# Patient Record
Sex: FEMALE | Employment: OTHER | URBAN - METROPOLITAN AREA
[De-identification: names, ages, dates, MRNs, and addresses within clinical notes are randomized per-mention and may not be internally consistent; named-entity substitution may affect disease eponyms.]

---

## 2017-01-10 ENCOUNTER — TELEPHONE (OUTPATIENT)
Dept: FAMILY MEDICINE CLINIC | Facility: CLINIC | Age: 52
End: 2017-01-10

## 2017-01-10 NOTE — TELEPHONE ENCOUNTER
Pt state she has head congestion, clear nasal discharge, fever and body aches last night. Advised increase fluids, rest, hot showers, decongestant. Advised to call if feeling worse in the next couple days. Pt verbalized understanding.

## 2017-01-25 ENCOUNTER — TELEPHONE (OUTPATIENT)
Dept: FAMILY MEDICINE CLINIC | Facility: CLINIC | Age: 52
End: 2017-01-25

## 2017-01-25 NOTE — TELEPHONE ENCOUNTER
pt takes macrobid to prevent uti's - she has run out and requesting new script - pt does not currently have UTI    . ... Please see attached message. Patient states she is in need of Macrobid. No UTI symptoms at this time.   Reports she frequen

## 2017-01-26 RX ORDER — NITROFURANTOIN 25; 75 MG/1; MG/1
100 CAPSULE ORAL 2 TIMES DAILY
Qty: 40 CAPSULE | Refills: 3 | Status: SHIPPED | OUTPATIENT
Start: 2017-01-26 | End: 2018-05-09 | Stop reason: CLARIF

## 2017-01-26 RX ORDER — NITROFURANTOIN 25; 75 MG/1; MG/1
100 CAPSULE ORAL 2 TIMES DAILY
Qty: 40 CAPSULE | Refills: 3 | Status: CANCELLED | OUTPATIENT
Start: 2017-01-26

## 2017-01-26 NOTE — PROGRESS NOTES
Steve 1827   Neurology; follow up CLINIC VISIT  2017    Kristin Rose Patient Status:  No patient class for patient encounter    1965 MRN [id number]   Location ED [facility] PCP DO JOSSUE Pan Disorder in her mother; rheumatoid arthritis in her father. SOCIAL HISTORY:   reports that she has never smoked. She has never used smokeless tobacco. She reports that she drinks alcohol. She reports that she does not use illicit drugs.     ALLERGIES: Patient is a 46year old female in no acute distress. appearance: Normal developed and well nourished , in no acute stress;   HEENT:  Normal conjunctiva, no abnormal secretion,   Neck supple,  No carotid bruit,  thyroid normal  Lungs are clear to ausculta Tremor of right hand    Relevant Orders    MRI BRAIN (W+WO) (CPT=70553)    MRI SPINE CERVICAL (W+WO) (CPT=72396)    Chronic motor tic disorder - Primary    Anxiety        EMG of arms, no signs of neuropathy or cervical radiculopathy,   Labs normal,

## 2017-01-26 NOTE — TELEPHONE ENCOUNTER
pt takes macrobid to prevent uti's - she has run out and requesting new script - pt does not currently have UTI   . ... Please see attached message. Patient states she is in need of Macrobid. No UTI symptoms at this time.  Reports she frequently h

## 2017-01-26 NOTE — PATIENT INSTRUCTIONS
Refill policies:    • Allow 2 business days for refills; controlled substances may take longer.   • Contact your pharmacy at least 5 days prior to running out of medication and have them send an electronic request or submit request through the “request re your physician has recommended that you have a procedure or additional testing performed. [facility] will contact your insurance carrier to obtain pre-certification or prior authorization.     Unfortunately, JAYSHREE has seen an increas

## 2017-01-26 NOTE — TELEPHONE ENCOUNTER
Pt called to check on the status of her request from yesterday, pt is going to be leaving out of town and needs this script. Pt asked to please expedite the script and ask elijah Hernandez to please approve it asap. Please call pt and advise.

## 2017-01-27 ENCOUNTER — TELEPHONE (OUTPATIENT)
Dept: NEUROLOGY | Facility: CLINIC | Age: 52
End: 2017-01-27

## 2017-01-27 NOTE — TELEPHONE ENCOUNTER
Spoke to RIZWANA at [facility], MRI Brain/Cervical [id number] no [place] is needed.  call [facility]#[phone] call time 33:46    Called pt and left a detailed message

## 2017-01-30 NOTE — TELEPHONE ENCOUNTER
MRI brain is the most sensitive test to rule out MS, in her case chance is very small, do not push her to do it,  if cost is the major concern, she probably should hold it, chose to seek another opinion to [facility] movement disorder clinic, call [facility] , find one

## 2017-01-30 NOTE — TELEPHONE ENCOUNTER
Spoke with patient on conveyed that Dr. Gavin Dejesus ordered the MRIs to r/o MS, and that the MRIs are typically the only type of imaging tests that can do this.   She verbalized understanding but still states that she is going to be unable to pay for these tests,

## 2017-01-31 NOTE — TELEPHONE ENCOUNTER
Spoke with patient and relayed below response per Dr Yaz Mejía. Patient states she would like to think about it for a while and will call office with update . She notes she does not want a second opinion at this time.

## 2017-01-31 NOTE — TELEPHONE ENCOUNTER
LMTCB to relay below. Movement disorder MD at [facility], Dr Jessica Gasca [phone].

## 2017-02-03 NOTE — TELEPHONE ENCOUNTER
Patient called back. Given contact nos for [facility] movement disorder MD below. Patient request medical records to be sent to [facility]. Informed patient she will need to sign a release form to fax records. Patient would like NANCY form faxed to fax # [phone].

## 2017-02-06 ENCOUNTER — TELEPHONE (OUTPATIENT)
Dept: NEUROLOGY | Facility: CLINIC | Age: 52
End: 2017-02-06

## 2017-02-06 NOTE — TELEPHONE ENCOUNTER
Original MRI orders were written with requirement of IV sedation. Pt wanted oral sedation instead (Rx for ativan printed) but MRI ordered were not changed. Central Scheduling unable to schedule tests without correct ordered.   Original orders were cancele

## 2017-02-07 NOTE — TELEPHONE ENCOUNTER
Pt calling because she is once again having concerns about the ordered MRIs. When scheduling, she was informed that each test (MRI brain and MRI c-spine) was ordered with and without contrast, thus the tests would take a total of about 3 hours.   Given her

## 2017-02-07 NOTE — TELEPHONE ENCOUNTER
Left detailed message for patient informing her that Dr. Myra Alvarado prefers she have both tests done both with and without contrast, and is will order tests with IV sedation.   Requested c/b from patient if she desires the MRI orders to be re-ordered with IV sed

## 2017-02-08 NOTE — TELEPHONE ENCOUNTER
Pt returned call, stating that she intends to complete the MRIs with IV sedation. Will re-order tests with IV sedation. Instructed patient to wait an hour to c/b central scheduling.

## 2017-02-13 ENCOUNTER — TELEPHONE (OUTPATIENT)
Dept: FAMILY MEDICINE CLINIC | Facility: CLINIC | Age: 52
End: 2017-02-13

## 2017-02-13 VITALS — BODY MASS INDEX: 17.43 KG/M2 | HEIGHT: 68 IN | WEIGHT: 115 LBS

## 2017-02-13 RX ORDER — MULTIVIT WITH MINERALS/LUTEIN
2000 TABLET ORAL DAILY
COMMUNITY
End: 2017-04-20 | Stop reason: ALTCHOICE

## 2017-02-13 RX ORDER — SODIUM CHLORIDE, SODIUM LACTATE, POTASSIUM CHLORIDE, CALCIUM CHLORIDE 600; 310; 30; 20 MG/100ML; MG/100ML; MG/100ML; MG/100ML
INJECTION, SOLUTION INTRAVENOUS CONTINUOUS
Status: CANCELLED | OUTPATIENT
Start: 2017-02-13

## 2017-02-13 NOTE — TELEPHONE ENCOUNTER
Dr. Eli Galvan has ordered the MRI. Attempted to call Dr. Michel Re office. Calls are being answered by answering service, no message left. ,faxed paper to Dr. Pranav Alvarado. Holding in [address]. Does Dr. Dave Toribio want to address this?  Does pt need MR

## 2017-02-14 ENCOUNTER — TELEPHONE (OUTPATIENT)
Dept: NEUROLOGY | Facility: CLINIC | Age: 52
End: 2017-02-14

## 2017-02-14 DIAGNOSIS — A49.9 ESBL (EXTENDED SPECTRUM BETA-LACTAMASE) PRODUCING BACTERIA INFECTION: Primary | ICD-10-CM

## 2017-02-14 DIAGNOSIS — Z16.12 ESBL (EXTENDED SPECTRUM BETA-LACTAMASE) PRODUCING BACTERIA INFECTION: Primary | ICD-10-CM

## 2017-02-14 NOTE — TELEPHONE ENCOUNTER
Spoke to nurse at Dr. Sami Milton office- they will put order in for MRSA cultures. Orders refaxed to them. Sent to scan.

## 2017-02-14 NOTE — TELEPHONE ENCOUNTER
Spoke with Rl Boyd from Dr. Angela Carias office, who states that patient has history of ESBL in her urine, and therefore due to pre-admission testing hospital guidelines must have 2 negative cultures at least 24 hours apart from site of original infection.   She

## 2017-02-15 NOTE — IMAGING NOTE
spoke w/pt. She was concerned about NPO status p/t MRI w/sedation next week 2/22. Reviewed protocol and reason why. Current H&P on chart. Pt had no other questions.

## 2017-02-15 NOTE — PROGRESS NOTES
A busy and understandably worried woman has a history of recurrent uncomplicated UTIs. While growing up as a little girl she had numerous issues with her urethra requiring stent placements and various dilatations.   She is still having recurrent symptoms e

## 2017-02-22 ENCOUNTER — HOSPITAL ENCOUNTER (OUTPATIENT)
Dept: MRI IMAGING | Facility: HOSPITAL | Age: 52
Discharge: HOME | End: 2017-02-22
Attending: Other
Payer: COMMERCIAL

## 2017-02-22 ENCOUNTER — APPOINTMENT (OUTPATIENT)
Dept: LAB | Facility: HOSPITAL | Age: 52
End: 2017-02-22
Attending: Other
Payer: COMMERCIAL

## 2017-02-22 ENCOUNTER — ANESTHESIA (OUTPATIENT)
Dept: PERIOP | Facility: HOSPITAL | Age: 52
End: 2017-02-22
Payer: COMMERCIAL

## 2017-02-22 ENCOUNTER — APPOINTMENT (OUTPATIENT)
Dept: MRI IMAGING | Facility: HOSPITAL | Age: 52
End: 2017-02-22
Attending: Other
Payer: COMMERCIAL

## 2017-02-22 ENCOUNTER — ANESTHESIA EVENT (OUTPATIENT)
Dept: PERIOP | Facility: HOSPITAL | Age: 52
End: 2017-02-22
Payer: COMMERCIAL

## 2017-02-22 VITALS
SYSTOLIC BLOOD PRESSURE: 121 MMHG | TEMPERATURE: 97 F | DIASTOLIC BLOOD PRESSURE: 80 MMHG | RESPIRATION RATE: 14 BRPM | HEART RATE: 58 BPM | OXYGEN SATURATION: 100 %

## 2017-02-22 VITALS
OXYGEN SATURATION: 100 % | DIASTOLIC BLOOD PRESSURE: 78 MMHG | RESPIRATION RATE: 16 BRPM | HEART RATE: 63 BPM | TEMPERATURE: 98 F | SYSTOLIC BLOOD PRESSURE: 127 MMHG

## 2017-02-22 DIAGNOSIS — R25.1 TREMOR OF RIGHT HAND: ICD-10-CM

## 2017-02-22 PROCEDURE — 70553 MRI BRAIN STEM W/O & W/DYE: CPT

## 2017-02-22 PROCEDURE — A9575 INJ GADOTERATE MEGLUMI 0.1ML: HCPCS | Performed by: OTHER

## 2017-02-22 PROCEDURE — 72156 MRI NECK SPINE W/O & W/DYE: CPT

## 2017-02-22 RX ORDER — NALOXONE HYDROCHLORIDE 0.4 MG/ML
80 INJECTION, SOLUTION INTRAMUSCULAR; INTRAVENOUS; SUBCUTANEOUS AS NEEDED
Status: ACTIVE | OUTPATIENT
Start: 2017-02-22 | End: 2017-02-22

## 2017-02-22 RX ORDER — ACETAMINOPHEN 500 MG
1000 TABLET ORAL ONCE AS NEEDED
Status: ACTIVE | OUTPATIENT
Start: 2017-02-22 | End: 2017-02-22

## 2017-02-22 RX ORDER — ONDANSETRON 2 MG/ML
4 INJECTION INTRAMUSCULAR; INTRAVENOUS AS NEEDED
Status: ACTIVE | OUTPATIENT
Start: 2017-02-22 | End: 2017-02-22

## 2017-02-22 RX ORDER — HYDROMORPHONE HYDROCHLORIDE 1 MG/ML
0.4 INJECTION, SOLUTION INTRAMUSCULAR; INTRAVENOUS; SUBCUTANEOUS EVERY 5 MIN PRN
Status: ACTIVE | OUTPATIENT
Start: 2017-02-22 | End: 2017-02-22

## 2017-02-22 RX ORDER — SODIUM CHLORIDE, SODIUM LACTATE, POTASSIUM CHLORIDE, CALCIUM CHLORIDE 600; 310; 30; 20 MG/100ML; MG/100ML; MG/100ML; MG/100ML
INJECTION, SOLUTION INTRAVENOUS CONTINUOUS
Status: DISCONTINUED | OUTPATIENT
Start: 2017-02-22 | End: 2017-02-26

## 2017-02-22 NOTE — IMAGING NOTE
Pt here accompanied by  Isaac Blanton for double study in MRI with anesthesia care. Pt has been NPO since 2200 last night, signed waiver for pregnancy, took med this morning with sips of water. IV placed after second stick.  Pt very nervous about MRI and sed

## 2017-02-22 NOTE — ANESTHESIA POSTPROCEDURE EVALUATION
[facility] Patient Status:  No patient class for patient encounter   Age/Gender 46year old female MRN [id number]   Location [facility] Attending No att. providers found   Hosp Day #  PCP Jacoby Guo

## 2017-02-22 NOTE — ANESTHESIA PREPROCEDURE EVALUATION
PRE-OP EVALUATION    Patient Name: Malini Castro    Pre-op Diagnosis: * No surgery found *    * No surgery found *    * Surgery not found *    Pre-op vitals reviewed.   Pulse: 72  Resp: 12  BP: 128/82 mmHg  SpO2: 100 %  There is no weight on file to calcula discussed with: patient and spouse                Present on Admission:   **None**    Discussed general anesthesia with endotracheal tube/supraglottic airway with patient. Discussed risk of oral/dental trauma, nausea, sore throat rare allergic reactions.

## 2017-02-23 ENCOUNTER — OFFICE VISIT (OUTPATIENT)
Dept: FAMILY MEDICINE CLINIC | Facility: CLINIC | Age: 52
End: 2017-02-23

## 2017-02-23 VITALS
OXYGEN SATURATION: 98 % | BODY MASS INDEX: 18.04 KG/M2 | RESPIRATION RATE: 18 BRPM | HEIGHT: 68 IN | TEMPERATURE: 98 F | HEART RATE: 78 BPM | SYSTOLIC BLOOD PRESSURE: 112 MMHG | DIASTOLIC BLOOD PRESSURE: 80 MMHG | WEIGHT: 119 LBS

## 2017-02-23 DIAGNOSIS — R35.0 FREQUENCY OF URINATION: Primary | ICD-10-CM

## 2017-02-23 DIAGNOSIS — N30.00 ACUTE CYSTITIS WITHOUT HEMATURIA: ICD-10-CM

## 2017-02-23 LAB
APPEARANCE: CLEAR
MULTISTIX LOT#: ABNORMAL NUMERIC
PH, URINE: 6 (ref 4.5–8)
SPECIFIC GRAVITY: 1.01 (ref 1–1.03)
URINE-COLOR: YELLOW
UROBILINOGEN,SEMI-QN: 0.2 MG/DL (ref 0–1.9)

## 2017-02-23 PROCEDURE — 87186 SC STD MICRODIL/AGAR DIL: CPT | Performed by: NURSE PRACTITIONER

## 2017-02-23 PROCEDURE — 87086 URINE CULTURE/COLONY COUNT: CPT | Performed by: NURSE PRACTITIONER

## 2017-02-23 PROCEDURE — 87088 URINE BACTERIA CULTURE: CPT | Performed by: NURSE PRACTITIONER

## 2017-02-23 PROCEDURE — 99213 OFFICE O/P EST LOW 20 MIN: CPT | Performed by: NURSE PRACTITIONER

## 2017-02-23 PROCEDURE — 81003 URINALYSIS AUTO W/O SCOPE: CPT | Performed by: NURSE PRACTITIONER

## 2017-02-23 RX ORDER — AMOXICILLIN 500 MG/1
CAPSULE ORAL
Refills: 5 | COMMUNITY
Start: 2017-02-20 | End: 2017-09-13

## 2017-02-24 NOTE — PROGRESS NOTES
CHIEF COMPLAINT:   Patient presents with:  UTI: which started three weeks ago she felt frequency, and discomfort. Was treating herself with macrobid and symptoms started back up again.  She also took AZO      HPI:   Jurgen Mckay is a 46year old female who Nitrofurantoin Monohyd Macro (MACROBID) 100 MG Oral Cap Take 1 capsule (100 mg total) by mouth 2 (two) times daily.  Used after sex Disp: 40 capsule Rfl: 3      Past Medical History   Diagnosis Date   • Acute cystitis    • Acute sinusitis, unspecified    • Result Value Ref Range   GLUCOSE (URINE DIPSTICK) neg Negative mg/dL   BILIRUBIN neg Negative   KETONES (URINE DIPSTICK) neg Negative mg/dL   SPECIFIC GRAVITY 1.010 1.005 - 1.030   OCCULT BLOOD mod Negative   PH, URINE 6.0 4.5 - 8.0   PROTEIN (URINE DIPSTI Bladder Infection, Female (Adult)    Urine is normally doesn't have any bacteria in it. But bacteria can get into the urinary tract from the skin around the rectum. Or they can travel in the blood from elsewhere in the body.  Once they are in your urinary t · Bowel incontinence  · Pregnancy  · Procedures such as having a catheter inserted  · Older age  · Not emptying your bladder. This can allow bacteria a chance to grow in your urine.   · Dehydration  · Constipation  · Sex  · Use of a diaphragm for birth cont · Avoid caffeine, alcohol, and spicy foods. These can irritate your bladder. · Urinate right after intercourse to flush out your bladder. · If you use birth control pills and have frequent bladder infections, discuss it with your doctor.   Follow-up care

## 2017-02-27 ENCOUNTER — TELEPHONE (OUTPATIENT)
Dept: FAMILY MEDICINE CLINIC | Facility: CLINIC | Age: 52
End: 2017-02-27

## 2017-02-27 NOTE — TELEPHONE ENCOUNTER
Advised pt to monitor her lip, if develops any redness warmth, swelling (signs of infection) to call the office. Pt verbalized understanding.
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
## 2017-02-27 NOTE — TELEPHONE ENCOUNTER
Patient calling back for lab results from yesterday's  that was left. Informed patient of urine culture results and that Macrobid that she is currently taking should cover the infection. Patient states she is feeling better, but symptoms are not gone.

## 2017-03-13 PROBLEM — M47.812 DJD (DEGENERATIVE JOINT DISEASE) OF CERVICAL SPINE: Status: ACTIVE | Noted: 2017-03-13

## 2017-03-13 NOTE — PROGRESS NOTES
Steve 1827   Neurology; follow up CLINIC VISIT  2017    Carmen Payne Patient Status:  No patient class for patient encounter    1965 MRN [id number]   Location [address] [place] [address],      REASON FAMILY HISTORY:  family history includes Hypertension in her father; Thyroid Disorder in her mother; rheumatoid arthritis in her father. SOCIAL HISTORY:   reports that she has been smoking Cigarettes.   She has never used smokeless tobacco. She rep denies food or seasonal allergies      PHYSICAL EXAMINATION:  VITAL SIGNS: /76 mmHg  Pulse 82  Resp 18  Wt 119 lb   Body mass index is 18.1 kg/(m^2). General:  Patient is a 46year old female in no acute distress.   appearance: Normal developed and w Tandem walk is normal      Problem List Items Addressed This Visit     Shakes    Neck pain    Relevant Orders    OP REFERRAL TO [facility]    Neck muscle spasm    Chronic motor tic disorder - Primary    Relevant Orders    NEURO - EXTER agreed this time,         Return if symptoms worsen or fail to improve. We discussed in depth regarding diagnosis, prognosis, treatment. The patient was  given ample opportunity to ask questions. All questions and concerns were addressed.      Renee Saul

## 2017-03-13 NOTE — PATIENT INSTRUCTIONS
Refill policies:    • Allow 2 business days for refills; controlled substances may take longer.   • Contact your pharmacy at least 5 days prior to running out of medication and have them send an electronic request or submit request through the “request re your physician has recommended that you have a procedure or additional testing performed. [facility] will contact your insurance carrier to obtain pre-certification or prior authorization.     Unfortunately, JAYSHREE has seen an increas

## 2017-03-29 ENCOUNTER — TELEPHONE (OUTPATIENT)
Dept: NEUROLOGY | Facility: CLINIC | Age: 52
End: 2017-03-29

## 2017-04-12 ENCOUNTER — TELEPHONE (OUTPATIENT)
Dept: FAMILY MEDICINE CLINIC | Facility: CLINIC | Age: 52
End: 2017-04-12

## 2017-04-12 ENCOUNTER — TELEPHONE (OUTPATIENT)
Dept: SURGERY | Facility: CLINIC | Age: 52
End: 2017-04-12

## 2017-04-12 NOTE — TELEPHONE ENCOUNTER
Patient stated that [address] [employer] sent her to a neurologist which she has been working with . The neurologist feels that she should see a movement disorder specialist.  She has been working with Gabe for a month because the  Is not in network.   Neurologist refuse

## 2017-04-12 NOTE — TELEPHONE ENCOUNTER
Not seeing a denied referral in system. Left message on patient identified voicemail (ok with HIPPA consent) asking patient what she is asking a [id number] review for.

## 2017-04-12 NOTE — TELEPHONE ENCOUNTER
Request is for a movement disorder specialist: Dr Lor Lopez, Dr Carlotta Koenig, or Dr Cary Apley.  States none of the specialists covered under her insurance are actual movement disorder specialists so she needs a [id number] to see above provider

## 2017-04-13 NOTE — TELEPHONE ENCOUNTER
Patient stated that [facility] sent her to a neurologist which she has been working with .  The neurologist feels that she should see a movement disorder specialist.  She has been working with Som Nuñez for a month because the  Is not in [employer] refuse

## 2017-04-18 ENCOUNTER — MED REC SCAN ONLY (OUTPATIENT)
Dept: OBGYN CLINIC | Facility: CLINIC | Age: 52
End: 2017-04-18

## 2017-04-20 ENCOUNTER — OFFICE VISIT (OUTPATIENT)
Dept: FAMILY MEDICINE CLINIC | Facility: CLINIC | Age: 52
End: 2017-04-20

## 2017-04-20 VITALS
HEART RATE: 64 BPM | SYSTOLIC BLOOD PRESSURE: 114 MMHG | HEIGHT: 68 IN | OXYGEN SATURATION: 98 % | DIASTOLIC BLOOD PRESSURE: 82 MMHG | BODY MASS INDEX: 18.04 KG/M2 | RESPIRATION RATE: 18 BRPM | WEIGHT: 119 LBS

## 2017-04-20 DIAGNOSIS — G24.9 DYSTONIA: Primary | ICD-10-CM

## 2017-04-20 PROCEDURE — 99213 OFFICE O/P EST LOW 20 MIN: CPT | Performed by: FAMILY MEDICINE

## 2017-04-20 NOTE — PROGRESS NOTES
He has had an extensive workup for what seems to be dystonia. She has seen Dr. Martel Search the female neurologist here in town who feels that she probably suffers from dystonia.   She also saw another neurologist nearby and everyone agrees that she is best se

## 2017-04-21 ENCOUNTER — TELEPHONE (OUTPATIENT)
Dept: FAMILY MEDICINE CLINIC | Facility: CLINIC | Age: 52
End: 2017-04-21

## 2017-04-21 DIAGNOSIS — G24.9 DYSTONIA: Primary | ICD-10-CM

## 2017-04-21 NOTE — TELEPHONE ENCOUNTER
Patient was seen in office yesterday by Dr Pati Mejía, she needs to be referral to [address] [place] [address],  Dr Oralia Navarro out of [facility].        Internal  Diagnosis:  G24.9  6 visits

## 2017-04-24 ENCOUNTER — OFFICE VISIT (OUTPATIENT)
Dept: OBGYN CLINIC | Facility: CLINIC | Age: 52
End: 2017-04-24

## 2017-04-24 VITALS
WEIGHT: 118 LBS | DIASTOLIC BLOOD PRESSURE: 68 MMHG | HEIGHT: 68 IN | BODY MASS INDEX: 17.88 KG/M2 | HEART RATE: 80 BPM | SYSTOLIC BLOOD PRESSURE: 126 MMHG

## 2017-04-24 DIAGNOSIS — Z01.419 WELL WOMAN EXAM WITH ROUTINE GYNECOLOGICAL EXAM: Primary | ICD-10-CM

## 2017-04-24 DIAGNOSIS — Z12.31 VISIT FOR SCREENING MAMMOGRAM: ICD-10-CM

## 2017-04-24 PROCEDURE — 99396 PREV VISIT EST AGE 40-64: CPT | Performed by: OBSTETRICS & GYNECOLOGY

## 2017-04-24 NOTE — PROGRESS NOTES
Fabrizio Del Valle is a 46year old female  No LMP recorded. Patient is not currently having periods (Reason: Menopause). Patient presents with:  Wellness Visit: annual exam  .  She has no complaints.   Denies postmenopausal bleeding, urinary or sexual is equivalent per week         Drug Use: No    Sexual Activity: Not on file   Not on file  Other Topics Concern    Caffeine Concern No    Exercise Yes    Comment: 3 x week     Social History Narrative       FAMILY HISTORY:  Family History   Problem Relation A situation.  Appropriate mood and affect    Pelvic Exam:  External Genitalia: normal appearance, hair distribution, and no lesions  Urethral Meatus:  normal in size, location, without lesions and prolapse  Bladder:  No fullness, masses or tenderness  Vagina:

## 2017-04-27 ENCOUNTER — TELEPHONE (OUTPATIENT)
Dept: FAMILY MEDICINE CLINIC | Facility: CLINIC | Age: 52
End: 2017-04-27

## 2017-06-08 ENCOUNTER — MED REC SCAN ONLY (OUTPATIENT)
Dept: OBGYN CLINIC | Facility: CLINIC | Age: 52
End: 2017-06-08

## 2017-07-05 PROBLEM — G24.3 CERVICAL DYSTONIA: Status: ACTIVE | Noted: 2017-07-05

## 2017-07-05 PROBLEM — J32.9 OTHER SINUSITIS: Status: ACTIVE | Noted: 2017-07-05

## 2017-10-30 ENCOUNTER — OFFICE VISIT (OUTPATIENT)
Dept: FAMILY MEDICINE CLINIC | Facility: CLINIC | Age: 52
End: 2017-10-30

## 2017-10-30 VITALS
WEIGHT: 118 LBS | BODY MASS INDEX: 17.88 KG/M2 | SYSTOLIC BLOOD PRESSURE: 124 MMHG | TEMPERATURE: 98 F | HEART RATE: 80 BPM | DIASTOLIC BLOOD PRESSURE: 84 MMHG | OXYGEN SATURATION: 97 % | RESPIRATION RATE: 16 BRPM | HEIGHT: 68 IN

## 2017-10-30 DIAGNOSIS — J02.9 SORE THROAT: ICD-10-CM

## 2017-10-30 DIAGNOSIS — J06.9 UPPER RESPIRATORY TRACT INFECTION, UNSPECIFIED TYPE: Primary | ICD-10-CM

## 2017-10-30 PROCEDURE — 99213 OFFICE O/P EST LOW 20 MIN: CPT | Performed by: PHYSICIAN ASSISTANT

## 2017-10-30 RX ORDER — AZITHROMYCIN 250 MG/1
TABLET, FILM COATED ORAL
Qty: 6 TABLET | Refills: 0 | Status: SHIPPED | OUTPATIENT
Start: 2017-10-30 | End: 2018-01-20

## 2017-10-30 NOTE — PROGRESS NOTES
HPI:   Sae Jiménez is a 46year old female who presents for cold symptoms for  5  days.  Patient reports sore throat, congestion, yellow colored nasal discharge, post nasal drip, has lost her voice, felt feverish but did not check her temperature, denies Years: 0.00         Types: Cigarettes  Smokeless tobacco: Never Used                      Comment: smoked socially 3-4 cigarettes per week  Alcohol use: Yes           4.2 oz/week     Standard drinks or equivalent: 7 per week          REVIEW OF SYSTEMS:   G care.

## 2018-01-20 ENCOUNTER — OFFICE VISIT (OUTPATIENT)
Dept: FAMILY MEDICINE CLINIC | Facility: CLINIC | Age: 53
End: 2018-01-20

## 2018-01-20 VITALS
SYSTOLIC BLOOD PRESSURE: 116 MMHG | DIASTOLIC BLOOD PRESSURE: 76 MMHG | HEART RATE: 99 BPM | OXYGEN SATURATION: 98 % | TEMPERATURE: 100 F | BODY MASS INDEX: 17.88 KG/M2 | HEIGHT: 68 IN | RESPIRATION RATE: 18 BRPM | WEIGHT: 118 LBS

## 2018-01-20 DIAGNOSIS — M22.41 CHONDROMALACIA OF PATELLA, RIGHT: ICD-10-CM

## 2018-01-20 DIAGNOSIS — J11.1 INFLUENZA: Primary | ICD-10-CM

## 2018-01-20 PROCEDURE — 99213 OFFICE O/P EST LOW 20 MIN: CPT | Performed by: FAMILY MEDICINE

## 2018-01-20 RX ORDER — ONDANSETRON 4 MG/1
4 TABLET, ORALLY DISINTEGRATING ORAL EVERY 8 HOURS PRN
Qty: 15 TABLET | Refills: 0 | Status: SHIPPED | OUTPATIENT
Start: 2018-01-20 | End: 2018-05-09

## 2018-01-20 RX ORDER — OSELTAMIVIR PHOSPHATE 75 MG/1
75 CAPSULE ORAL 2 TIMES DAILY
Qty: 10 CAPSULE | Refills: 0 | Status: SHIPPED | OUTPATIENT
Start: 2018-01-20 | End: 2018-01-25

## 2018-01-20 RX ORDER — AMOXICILLIN 500 MG/1
500 CAPSULE ORAL 2 TIMES DAILY
COMMUNITY
End: 2018-02-21

## 2018-01-20 NOTE — PROGRESS NOTES
Beginning the fourth day of head congestion and dry cough. Has had tactile fevers did not take her temperature. Positive chills. No nausea. No vomiting. Diffuse myalgias.    is been suffering with a cold for about 10 days but became acutely ill % External Cream Apply 1 Application topically 2 (two) times daily. Apply to back twice daily Disp: 45 g Rfl: 3   Nitrofurantoin Monohyd Macro (MACROBID) 100 MG Oral Cap Take 1 capsule (100 mg total) by mouth 2 (two) times daily.  Used after sex Disp: 36 ca

## 2018-01-22 ENCOUNTER — TELEPHONE (OUTPATIENT)
Dept: FAMILY MEDICINE CLINIC | Facility: CLINIC | Age: 53
End: 2018-01-22

## 2018-01-22 NOTE — TELEPHONE ENCOUNTER
PA started on cover my meds await decision. Spoke with pt she states Dr Antonio Askew originally prescribed this  Back in 12/20/17 for chondromalacia of right patella.   Pt states she has tried ASA,advil,aleve,motrin,naprosyn and otc icy hot patches with no relie

## 2018-01-25 NOTE — TELEPHONE ENCOUNTER
Diclofenac 1.5% solution denied per pt's insurance. Case [id number].   In order to approve coverage of this, program requirements must be met:  Must have tried a generic prescription strength oral NSAID in the past 90 days unless documented side effect or cont

## 2018-05-09 ENCOUNTER — OFFICE VISIT (OUTPATIENT)
Dept: OBGYN CLINIC | Facility: CLINIC | Age: 53
End: 2018-05-09

## 2018-05-09 VITALS
DIASTOLIC BLOOD PRESSURE: 52 MMHG | HEIGHT: 68 IN | WEIGHT: 119 LBS | BODY MASS INDEX: 18.04 KG/M2 | HEART RATE: 80 BPM | SYSTOLIC BLOOD PRESSURE: 114 MMHG

## 2018-05-09 DIAGNOSIS — F52.6 DYSPAREUNIA IN FEMALE NOT DUE TO SUBSTANCE OR KNOWN PHYSIOLOGICAL CONDITION: ICD-10-CM

## 2018-05-09 DIAGNOSIS — Z12.39 SCREENING FOR MALIGNANT NEOPLASM OF BREAST: ICD-10-CM

## 2018-05-09 DIAGNOSIS — Z01.419 WELL WOMAN EXAM WITH ROUTINE GYNECOLOGICAL EXAM: Primary | ICD-10-CM

## 2018-05-09 PROCEDURE — 99396 PREV VISIT EST AGE 40-64: CPT | Performed by: OBSTETRICS & GYNECOLOGY

## 2018-05-09 NOTE — PROGRESS NOTES
Tesfaye Britton is a 45year old female  No LMP recorded. Patient is not currently having periods (Reason: Menopause). Patient presents with:  Wellness Visit  . She has no complaints. Denies postmenopausal bleeding, urinary or sexual issues.   Blood Occupational History  None on file     Social History Main Topics   Smoking status: Current Some Day Smoker     Types: Cigarettes    Smokeless tobacco: Never Used    Comment: smoked socially 3-4 cigarettes per week    Alcohol use Yes  4.2 oz/week    7 bruising or bleeding.       PHYSICAL EXAM:   Constitutional: well developed, well nourished  Head/Face: normocephalic  Neck/Thyroid: thyroid symmetric, no thyromegaly, no nodules, no adenopathy  Breast: normal without palpable masses, tenderness, asymmetry,

## 2018-05-17 ENCOUNTER — TELEPHONE (OUTPATIENT)
Dept: FAMILY MEDICINE CLINIC | Facility: CLINIC | Age: 53
End: 2018-05-17

## 2018-05-17 RX ORDER — NITROFURANTOIN 25; 75 MG/1; MG/1
CAPSULE ORAL
Qty: 60 CAPSULE | Refills: 0 | Status: SHIPPED | OUTPATIENT
Start: 2018-05-17 | End: 2018-09-19

## 2018-05-17 NOTE — TELEPHONE ENCOUNTER
Pt called back to check on the status of her refill, pt is leaving out of town tomorrow. Please call and advise.

## 2018-05-17 NOTE — TELEPHONE ENCOUNTER
Going out of town this weekend.   She had refills on her prescription but it  and she didn't realized it for macrobid

## 2018-08-01 NOTE — PROGRESS NOTES
HPI:   Darnell Lamb is a 45year old female with cervical dystonia, recurrent UTI, acne who presents with UTI symptoms. Pt finished 14 day course of macrobid twice daily on Sunday evening. Takes macrobid before/after intercourse.      Symptoms started • Oral aphthae    • Pap smear for cervical cancer screening 01/16,11/13,02/11,10/9,08/08,05/07,02/06,02/05,11/03    negative   • Personal history of urinary (tract) infection    • Pleurisy without mention of effusion or current tuberculosis    • Pleurisy on bactrim. Urology consult-Dr Yara Curtis.   Drink plenty of water, avoid holding bladder, urinate after intercourse. RTC in 1 week if not better or sooner if needed. Pt's questions answered to satisfaction.  She verbalized understanding and is in agreement w

## 2018-08-02 ENCOUNTER — OFFICE VISIT (OUTPATIENT)
Dept: FAMILY MEDICINE CLINIC | Facility: CLINIC | Age: 53
End: 2018-08-02
Payer: COMMERCIAL

## 2018-08-02 VITALS
HEIGHT: 68 IN | BODY MASS INDEX: 17.88 KG/M2 | HEART RATE: 86 BPM | WEIGHT: 118 LBS | SYSTOLIC BLOOD PRESSURE: 126 MMHG | TEMPERATURE: 98 F | DIASTOLIC BLOOD PRESSURE: 82 MMHG | OXYGEN SATURATION: 98 % | RESPIRATION RATE: 16 BRPM

## 2018-08-02 DIAGNOSIS — N39.0 RECURRENT UTI (URINARY TRACT INFECTION): Primary | ICD-10-CM

## 2018-08-02 LAB
BILIRUBIN: NEGATIVE
GLUCOSE (URINE DIPSTICK): NEGATIVE MG/DL
KETONES (URINE DIPSTICK): NEGATIVE MG/DL
LEUKOCYTES: NEGATIVE
MULTISTIX LOT#: NORMAL NUMERIC
NITRITE, URINE: NEGATIVE
PH, URINE: 5.5 (ref 4.5–8)
PROTEIN (URINE DIPSTICK): NEGATIVE MG/DL
SPECIFIC GRAVITY: 1.01 (ref 1–1.03)

## 2018-08-02 PROCEDURE — 87086 URINE CULTURE/COLONY COUNT: CPT | Performed by: PHYSICIAN ASSISTANT

## 2018-08-02 PROCEDURE — 81003 URINALYSIS AUTO W/O SCOPE: CPT | Performed by: PHYSICIAN ASSISTANT

## 2018-08-02 PROCEDURE — 99213 OFFICE O/P EST LOW 20 MIN: CPT | Performed by: PHYSICIAN ASSISTANT

## 2018-08-02 RX ORDER — SULFAMETHOXAZOLE AND TRIMETHOPRIM 800; 160 MG/1; MG/1
1 TABLET ORAL 2 TIMES DAILY
Qty: 14 TABLET | Refills: 0 | Status: SHIPPED | OUTPATIENT
Start: 2018-08-02 | End: 2018-08-08

## 2018-08-08 PROCEDURE — 81015 MICROSCOPIC EXAM OF URINE: CPT | Performed by: UROLOGY

## 2018-08-20 ENCOUNTER — OFFICE VISIT (OUTPATIENT)
Dept: FAMILY MEDICINE CLINIC | Facility: CLINIC | Age: 53
End: 2018-08-20
Payer: COMMERCIAL

## 2018-08-20 VITALS
HEIGHT: 68 IN | OXYGEN SATURATION: 98 % | RESPIRATION RATE: 20 BRPM | WEIGHT: 115 LBS | TEMPERATURE: 99 F | DIASTOLIC BLOOD PRESSURE: 80 MMHG | HEART RATE: 89 BPM | BODY MASS INDEX: 17.43 KG/M2 | SYSTOLIC BLOOD PRESSURE: 118 MMHG

## 2018-08-20 DIAGNOSIS — R30.0 DYSURIA: Primary | ICD-10-CM

## 2018-08-20 LAB
APPEARANCE: CLEAR
BILIRUBIN: NEGATIVE
GLUCOSE (URINE DIPSTICK): NEGATIVE MG/DL
KETONES (URINE DIPSTICK): NEGATIVE MG/DL
MULTISTIX LOT#: ABNORMAL NUMERIC
NITRITE, URINE: NEGATIVE
PH, URINE: 5.5 (ref 4.5–8)
PROTEIN (URINE DIPSTICK): NEGATIVE MG/DL
SPECIFIC GRAVITY: 1 (ref 1–1.03)
UROBILINOGEN,SEMI-QN: 0.2 MG/DL (ref 0–1.9)

## 2018-08-20 PROCEDURE — 81003 URINALYSIS AUTO W/O SCOPE: CPT | Performed by: NURSE PRACTITIONER

## 2018-08-20 PROCEDURE — 87086 URINE CULTURE/COLONY COUNT: CPT | Performed by: NURSE PRACTITIONER

## 2018-08-20 PROCEDURE — 99213 OFFICE O/P EST LOW 20 MIN: CPT | Performed by: NURSE PRACTITIONER

## 2018-08-20 RX ORDER — SULFAMETHOXAZOLE AND TRIMETHOPRIM 800; 160 MG/1; MG/1
1 TABLET ORAL 2 TIMES DAILY
Qty: 14 TABLET | Refills: 0 | Status: SHIPPED | OUTPATIENT
Start: 2018-08-20 | End: 2018-08-27

## 2018-08-20 RX ORDER — NITROFURANTOIN 25; 75 MG/1; MG/1
CAPSULE ORAL
Qty: 14 CAPSULE | Refills: 0 | Status: SHIPPED | OUTPATIENT
Start: 2018-08-20 | End: 2018-08-20 | Stop reason: ALTCHOICE

## 2018-08-20 NOTE — PATIENT INSTRUCTIONS
Dysuria with Uncertain Cause (Adult)    The urethra is the tube that allows urine to pass out of the body. In a woman, the urethra is the opening above the vagina. In men, the urethra is the opening on the tip of the penis.  Dysuria is the feeling of pain · Contact your healthcare provider or go to an urgent care clinic or the public health department to be looked at and treated.   · Don't have sex until both you and your partner(s) have finished all antibiotics and your healthcare provider says you are no l

## 2018-08-20 NOTE — PROGRESS NOTES
CHIEF COMPLAINT:   Patient presents with:  Urinary Frequency: s/s since AM. No OTC meds taken  Painful Urination      HPI:   Geovanna Vazquez is a 45year old female who presents with symptoms of UTI.  Complaining of urinary frequency and dysuria since this mo • Pleurisy without mention of effusion or current tuberculosis    • Pleurisy without mention of effusion or current tuberculosis       Social History:  Smoking status: Current Some Day Smoker                                                    Packs/day: 0. [employer] Expiration Date 5/31/19 Date         ASSESSMENT AND PLAN:   Hannah Parent is a 45year old female presents with UTI symptoms. ASSESSMENT:  Uti symptoms  (primary encounter diagnosis)    PLAN:   - Dipstick with small leuks and trace blood.   - L Soaps, lotions, colognes and feminine hygiene products can cause dysuria. So can birth control jellies, creams, and foams. It will go away 1 to 3 days after using these irritants.   Sexually transmitted diseases (STDs) such as chlamydia or gonorrhea can cau Follow up with your healthcare provider, or as advised. If a culture was taken, you may call as directed for the results. If you have an STD, follow up with your provider or the public health department for a complete STD screening, including HIV testing.

## 2018-09-17 ENCOUNTER — APPOINTMENT (OUTPATIENT)
Dept: GENERAL RADIOLOGY | Age: 53
End: 2018-09-17
Attending: EMERGENCY MEDICINE
Payer: COMMERCIAL

## 2018-09-17 ENCOUNTER — HOSPITAL ENCOUNTER (EMERGENCY)
Age: 53
Discharge: HOME | End: 2018-09-17
Attending: EMERGENCY MEDICINE
Payer: COMMERCIAL

## 2018-09-17 VITALS
TEMPERATURE: 97 F | HEIGHT: 68 IN | BODY MASS INDEX: 17.43 KG/M2 | SYSTOLIC BLOOD PRESSURE: 128 MMHG | HEART RATE: 84 BPM | DIASTOLIC BLOOD PRESSURE: 89 MMHG | RESPIRATION RATE: 18 BRPM | WEIGHT: 115 LBS | OXYGEN SATURATION: 98 %

## 2018-09-17 DIAGNOSIS — B34.9 VIRAL SYNDROME: Primary | ICD-10-CM

## 2018-09-17 DIAGNOSIS — J98.01 BRONCHOSPASM: ICD-10-CM

## 2018-09-17 PROCEDURE — 94640 AIRWAY INHALATION TREATMENT: CPT

## 2018-09-17 PROCEDURE — 71046 X-RAY EXAM CHEST 2 VIEWS: CPT | Performed by: EMERGENCY MEDICINE

## 2018-09-17 PROCEDURE — 94664 DEMO&/EVAL PT USE INHALER: CPT

## 2018-09-17 PROCEDURE — 99284 EMERGENCY DEPT VISIT MOD MDM: CPT

## 2018-09-17 RX ORDER — IPRATROPIUM BROMIDE AND ALBUTEROL SULFATE 2.5; .5 MG/3ML; MG/3ML
3 SOLUTION RESPIRATORY (INHALATION) ONCE
Status: COMPLETED | OUTPATIENT
Start: 2018-09-17 | End: 2018-09-17

## 2018-09-17 RX ORDER — AZITHROMYCIN 250 MG/1
TABLET, FILM COATED ORAL
Qty: 1 PACKAGE | Refills: 0 | Status: SHIPPED | OUTPATIENT
Start: 2018-09-17 | End: 2018-09-22

## 2018-09-17 RX ORDER — ALBUTEROL SULFATE 90 UG/1
2 AEROSOL, METERED RESPIRATORY (INHALATION) EVERY 4 HOURS PRN
Qty: 1 INHALER | Refills: 0 | Status: SHIPPED | OUTPATIENT
Start: 2018-09-17 | End: 2018-10-17

## 2018-09-17 RX ORDER — PREDNISONE 20 MG/1
20 TABLET ORAL 2 TIMES DAILY
Qty: 10 TABLET | Refills: 0 | Status: SHIPPED | OUTPATIENT
Start: 2018-09-17 | End: 2018-09-22

## 2018-09-17 NOTE — ED PROVIDER NOTES
Patient Seen in: [facility] Emergency Department In [place]    History   Patient presents with:  Dyspnea GENE SOB (respiratory)    Stated Complaint: [place]    HPI    Patient complains of cough and congestion symptoms that started on Thursday.   P Never Smoker      Smokeless tobacco: Never Used      Tobacco comment: smoked socially 3-4 cigarettes per week    Alcohol use:  Yes      Alcohol/week: 4.2 oz      Types: 7 Standard drinks or equivalent per week    Drug use: No      Review of Systems    Posit with what sounds like a viral URI. Her symptoms are also suggestive of viral syndrome. The patient has bronchospasm on exam.  She may benefit from bronchodilator and a short course of steroid.   Unless pneumonia identified on x-ray, as noted this is likel

## 2018-09-17 NOTE — ED INITIAL ASSESSMENT (HPI)
Non productive cough for 3-4 days, tonight she had a coughing fit and she was unable to catch her breath and felt like her throat was closing.  No distress noted

## 2018-09-19 ENCOUNTER — TELEPHONE (OUTPATIENT)
Dept: FAMILY MEDICINE CLINIC | Facility: CLINIC | Age: 53
End: 2018-09-19

## 2018-09-19 ENCOUNTER — OFFICE VISIT (OUTPATIENT)
Dept: FAMILY MEDICINE CLINIC | Facility: CLINIC | Age: 53
End: 2018-09-19
Payer: COMMERCIAL

## 2018-09-19 VITALS
RESPIRATION RATE: 20 BRPM | OXYGEN SATURATION: 98 % | WEIGHT: 118 LBS | TEMPERATURE: 99 F | DIASTOLIC BLOOD PRESSURE: 80 MMHG | HEART RATE: 58 BPM | HEIGHT: 68 IN | SYSTOLIC BLOOD PRESSURE: 126 MMHG | BODY MASS INDEX: 17.88 KG/M2

## 2018-09-19 DIAGNOSIS — R09.81 NASAL CONGESTION: ICD-10-CM

## 2018-09-19 DIAGNOSIS — I49.9 IRREGULAR HEART RHYTHM: ICD-10-CM

## 2018-09-19 DIAGNOSIS — R05.9 COUGH: Primary | ICD-10-CM

## 2018-09-19 PROCEDURE — 93000 ELECTROCARDIOGRAM COMPLETE: CPT | Performed by: PHYSICIAN ASSISTANT

## 2018-09-19 PROCEDURE — 99214 OFFICE O/P EST MOD 30 MIN: CPT | Performed by: PHYSICIAN ASSISTANT

## 2018-09-19 NOTE — PROGRESS NOTES
HPI:   Felicity Alatorre is a 45year old female who presents for ER follow up. Pt seen at [facility] ER on 9/17/18. She was diagnosed with a viral URI; she was given a duoneb in the ER.  She had a chest xray which showed mild hyperinflation without mass or infiltr valve disorders(424.0)    • Oral aphthae    • Pap smear for cervical cancer screening 01/16,11/13,02/11,10/9,08/08,05/07,02/06,02/05,11/03    negative   • Personal history of urinary (tract) infection    • Pleurisy without mention of effusion or current tu ELECTROCARDIOGRAM, COMPLETE    Note and results from ER reviewed in detail and discussed. Take antibiotics to completion and continue supportive measures. Follow up if symptoms persist or worsen.   Tiff Schulte was given an opportunity to ask questions and ve

## 2018-09-19 NOTE — TELEPHONE ENCOUNTER
Spoke with patient, advised to start with 1 tablet BID, monitor symptoms closely and then increase if symptoms are not covered, but not to exceed the max dose per medication box directions. Pt expressed understanding and agreement. Task completed.

## 2018-09-25 ENCOUNTER — TELEPHONE (OUTPATIENT)
Dept: FAMILY MEDICINE CLINIC | Facility: CLINIC | Age: 53
End: 2018-09-25

## 2018-09-25 NOTE — TELEPHONE ENCOUNTER
Pt called and she will be in meetings all afternoon, so she will just be in tomorrow at appointment at 10:00am

## 2018-12-31 ENCOUNTER — OFFICE VISIT (OUTPATIENT)
Dept: FAMILY MEDICINE CLINIC | Facility: CLINIC | Age: 53
End: 2018-12-31
Payer: COMMERCIAL

## 2018-12-31 VITALS
OXYGEN SATURATION: 94 % | DIASTOLIC BLOOD PRESSURE: 82 MMHG | HEART RATE: 68 BPM | RESPIRATION RATE: 16 BRPM | BODY MASS INDEX: 18.34 KG/M2 | TEMPERATURE: 98 F | HEIGHT: 68 IN | WEIGHT: 121 LBS | SYSTOLIC BLOOD PRESSURE: 132 MMHG

## 2018-12-31 DIAGNOSIS — M43.6 SPASTIC TORTICOLLIS: Primary | ICD-10-CM

## 2018-12-31 PROCEDURE — 99213 OFFICE O/P EST LOW 20 MIN: CPT | Performed by: FAMILY MEDICINE

## 2018-12-31 NOTE — PROGRESS NOTES
Patient is here today because she has been experiencing a very upsetting condition that is described as torticollis-like or dystonic like.   Is been going on for a large number of months and is now bothersome because it seems to be cauterizing of hoarseness

## 2019-01-23 NOTE — PROGRESS NOTES
HPI:   Kristen Villalpando is a 52year old female who presents with UTI symptoms.      Symptoms started 2 months ago, worse in the last week  Urine seems cloudy  +mild Dysuria  Denies hematuria  Denies Urinary frequency  Denies urinary urgency  Denies fever/chil Use      Smoking status: Never Smoker      Smokeless tobacco: Never Used      Tobacco comment: smoked socially 3-4 cigarettes per week    Alcohol use: Yes      Alcohol/week: 4.2 oz      Types: 7 Standard drinks or equivalent per week    Drug use:  No not better or sooner if needed  Pt's questions answered to satisfaction. She verbalized understanding and is in agreement with plan.

## 2019-01-24 ENCOUNTER — OFFICE VISIT (OUTPATIENT)
Dept: FAMILY MEDICINE CLINIC | Facility: CLINIC | Age: 54
End: 2019-01-24
Payer: COMMERCIAL

## 2019-01-24 VITALS
WEIGHT: 121 LBS | TEMPERATURE: 98 F | OXYGEN SATURATION: 98 % | HEART RATE: 63 BPM | SYSTOLIC BLOOD PRESSURE: 126 MMHG | RESPIRATION RATE: 18 BRPM | DIASTOLIC BLOOD PRESSURE: 80 MMHG | HEIGHT: 68 IN | BODY MASS INDEX: 18.34 KG/M2

## 2019-01-24 DIAGNOSIS — M54.9 MID BACK PAIN ON LEFT SIDE: ICD-10-CM

## 2019-01-24 DIAGNOSIS — R09.89 ABNORMAL LUNG SOUNDS: ICD-10-CM

## 2019-01-24 DIAGNOSIS — N39.0 RECURRENT UTI: Primary | ICD-10-CM

## 2019-01-24 DIAGNOSIS — R31.29 MICROSCOPIC HEMATURIA: ICD-10-CM

## 2019-01-24 LAB
APPEARANCE: CLEAR
BILIRUBIN: NEGATIVE
GLUCOSE (URINE DIPSTICK): NEGATIVE MG/DL
KETONES (URINE DIPSTICK): NEGATIVE MG/DL
LEUKOCYTES: NEGATIVE
MULTISTIX LOT#: NORMAL NUMERIC
NITRITE, URINE: NEGATIVE
PH, URINE: 6 (ref 4.5–8)
PROTEIN (URINE DIPSTICK): NEGATIVE MG/DL
SPECIFIC GRAVITY: 1 (ref 1–1.03)
URINE-COLOR: YELLOW
UROBILINOGEN,SEMI-QN: 0.2 MG/DL (ref 0–1.9)

## 2019-01-24 PROCEDURE — 81003 URINALYSIS AUTO W/O SCOPE: CPT | Performed by: PHYSICIAN ASSISTANT

## 2019-01-24 PROCEDURE — 87086 URINE CULTURE/COLONY COUNT: CPT | Performed by: PHYSICIAN ASSISTANT

## 2019-01-24 PROCEDURE — 99213 OFFICE O/P EST LOW 20 MIN: CPT | Performed by: PHYSICIAN ASSISTANT

## 2019-03-06 ENCOUNTER — TELEPHONE (OUTPATIENT)
Dept: FAMILY MEDICINE CLINIC | Facility: CLINIC | Age: 54
End: 2019-03-06

## 2019-03-06 NOTE — TELEPHONE ENCOUNTER
Approve/ deny Flonase refill? Last OV was with LEO on 12/31/18  Medication has not been dispensed since 2016  Please advise.

## 2019-03-06 NOTE — TELEPHONE ENCOUNTER
Pt experiencing some seasonal allergies and asking for flonase refill - she does not want to take time off work for appt right now. She has been prescribed this in the past from Dr. Mary Toscano.

## 2019-03-08 RX ORDER — FLUTICASONE PROPIONATE 50 MCG
SPRAY, SUSPENSION (ML) NASAL
Qty: 3 BOTTLE | Refills: 1 | Status: SHIPPED | OUTPATIENT
Start: 2019-03-08 | End: 2020-11-28

## 2019-05-28 ENCOUNTER — TELEPHONE (OUTPATIENT)
Dept: OBGYN CLINIC | Facility: CLINIC | Age: 54
End: 2019-05-28

## 2019-05-28 NOTE — TELEPHONE ENCOUNTER
Spoke with patient regarding missed appointment, patient states she will call back to reschedule once she has her work schedule.

## 2019-06-11 ENCOUNTER — OFFICE VISIT (OUTPATIENT)
Dept: OBGYN CLINIC | Facility: CLINIC | Age: 54
End: 2019-06-11
Payer: COMMERCIAL

## 2019-06-11 VITALS
BODY MASS INDEX: 18.19 KG/M2 | SYSTOLIC BLOOD PRESSURE: 116 MMHG | HEART RATE: 87 BPM | DIASTOLIC BLOOD PRESSURE: 66 MMHG | HEIGHT: 68 IN | WEIGHT: 120 LBS

## 2019-06-11 DIAGNOSIS — Z01.419 WELL WOMAN EXAM WITH ROUTINE GYNECOLOGICAL EXAM: ICD-10-CM

## 2019-06-11 DIAGNOSIS — Z12.31 ENCOUNTER FOR SCREENING MAMMOGRAM FOR MALIGNANT NEOPLASM OF BREAST: Primary | ICD-10-CM

## 2019-06-11 DIAGNOSIS — Z12.4 PAPANICOLAOU SMEAR FOR CERVICAL CANCER SCREENING: ICD-10-CM

## 2019-06-11 PROCEDURE — 88175 CYTOPATH C/V AUTO FLUID REDO: CPT | Performed by: OBSTETRICS & GYNECOLOGY

## 2019-06-11 PROCEDURE — 87624 HPV HI-RISK TYP POOLED RSLT: CPT | Performed by: OBSTETRICS & GYNECOLOGY

## 2019-06-11 PROCEDURE — 99396 PREV VISIT EST AGE 40-64: CPT | Performed by: OBSTETRICS & GYNECOLOGY

## 2019-06-11 NOTE — PROGRESS NOTES
Hannah Parent is a 52year old female  No LMP recorded. (Menstrual status: Menopause). Patient presents with:  Wellness Visit  .          OBSTETRICS HISTORY:  OB History    Para Term  AB Living   3 2 2   1 2   SAB TAB Ectopic Multiple L Worry: Not on file        Inability: Not on file      Transportation needs:        Medical: Not on file        Non-medical: Not on file    Tobacco Use      Smoking status: Never Smoker      Smokeless tobacco: Never Used    Substance and Sexual Activity Known Problems Paternal Grandmother    • No Known Problems Paternal Grandfather    • Cancer Maternal Cousin Female 54        bone   • No Known Problems Daughter    • No Known Problems Son    • Skin cancer Maternal Uncle [de-identified]       MEDICATIONS:    Current Out discharge  Cervix:  Normal without tenderness on motionpap  Uterus: normal in size, contour, position, mobility, without tenderness  Adnexa: normal without masses or tenderness  Perineum: normal  Anus: no hemorroids     Assessment & Plan:  Bro Rick was seen t

## 2019-06-13 ENCOUNTER — TELEPHONE (OUTPATIENT)
Dept: OBGYN CLINIC | Facility: CLINIC | Age: 54
End: 2019-06-13

## 2019-06-13 NOTE — TELEPHONE ENCOUNTER
Pt just called to get her test results. She got her email thru Jadyn with results but does not understand them. Please advise and call pt.  Thanks

## 2019-06-17 ENCOUNTER — TELEPHONE (OUTPATIENT)
Dept: OBGYN CLINIC | Facility: CLINIC | Age: 54
End: 2019-06-17

## 2019-06-17 ENCOUNTER — MED REC SCAN ONLY (OUTPATIENT)
Dept: OBGYN CLINIC | Facility: CLINIC | Age: 54
End: 2019-06-17

## 2019-11-18 ENCOUNTER — OFFICE VISIT (OUTPATIENT)
Dept: FAMILY MEDICINE CLINIC | Facility: CLINIC | Age: 54
End: 2019-11-18
Payer: COMMERCIAL

## 2019-11-18 VITALS
RESPIRATION RATE: 16 BRPM | DIASTOLIC BLOOD PRESSURE: 86 MMHG | WEIGHT: 122 LBS | BODY MASS INDEX: 18.49 KG/M2 | HEIGHT: 68 IN | OXYGEN SATURATION: 98 % | TEMPERATURE: 98 F | HEART RATE: 78 BPM | SYSTOLIC BLOOD PRESSURE: 124 MMHG

## 2019-11-18 DIAGNOSIS — R23.8 SCALP IRRITATION: Primary | ICD-10-CM

## 2019-11-18 DIAGNOSIS — R59.0 OCCIPITAL LYMPHADENOPATHY: ICD-10-CM

## 2019-11-18 PROCEDURE — 99213 OFFICE O/P EST LOW 20 MIN: CPT | Performed by: PHYSICIAN ASSISTANT

## 2019-11-18 RX ORDER — KETOCONAZOLE 20 MG/ML
SHAMPOO TOPICAL
Qty: 120 ML | Refills: 0 | Status: SHIPPED | OUTPATIENT
Start: 2019-11-18 | End: 2020-11-28

## 2019-11-18 NOTE — PROGRESS NOTES
HPI:    Patient ID: Kristin Rose is a 52year old female. HPI   Patient presents today with concerns of an itchy rash of her scalp and enlarged occipital lymph nodes.   States [facility] she was at a salon where clients can self style their hair using SWELLING    Comment:Facial swelling   PHYSICAL EXAM:   Physical Exam   Nursing note and vitals reviewed. Constitutional: She appears well-developed and well-nourished. No distress. HENT:   Head: Normocephalic and atraumatic.    Lymphadenopathy:        H

## 2019-11-21 ENCOUNTER — OFFICE VISIT (OUTPATIENT)
Dept: FAMILY MEDICINE CLINIC | Facility: CLINIC | Age: 54
End: 2019-11-21
Payer: COMMERCIAL

## 2019-11-21 VITALS
OXYGEN SATURATION: 98 % | WEIGHT: 122 LBS | HEART RATE: 69 BPM | BODY MASS INDEX: 18.49 KG/M2 | DIASTOLIC BLOOD PRESSURE: 82 MMHG | RESPIRATION RATE: 18 BRPM | SYSTOLIC BLOOD PRESSURE: 120 MMHG | HEIGHT: 68 IN | TEMPERATURE: 98 F

## 2019-11-21 DIAGNOSIS — B85.0 PEDICULOSIS CAPITIS: Primary | ICD-10-CM

## 2019-11-21 PROCEDURE — 99213 OFFICE O/P EST LOW 20 MIN: CPT | Performed by: PHYSICIAN ASSISTANT

## 2019-11-21 NOTE — PROGRESS NOTES
HPI:    Patient ID: Grabiel Wong is a 62year old female. HPI   Patient here for follow-up of scalp irritation. She was last seen in the office 3 days ago.   At that time her exam was not typical for pediculosis infection and was empirically treated wi Normocephalic and atraumatic. Lymphadenopathy:        Head (right side): Occipital adenopathy present. Head (left side): Occipital adenopathy present. Skin: Rash noted. Rash is macular (erythematous macular rash of the scalp, no vesicles.  Small

## 2020-06-16 ENCOUNTER — OFFICE VISIT (OUTPATIENT)
Dept: FAMILY MEDICINE CLINIC | Facility: CLINIC | Age: 55
End: 2020-06-16
Payer: COMMERCIAL

## 2020-06-16 VITALS
HEART RATE: 80 BPM | BODY MASS INDEX: 18.04 KG/M2 | HEIGHT: 68 IN | SYSTOLIC BLOOD PRESSURE: 122 MMHG | WEIGHT: 119 LBS | RESPIRATION RATE: 18 BRPM | DIASTOLIC BLOOD PRESSURE: 76 MMHG | TEMPERATURE: 98 F

## 2020-06-16 DIAGNOSIS — M25.579 ANKLE PAIN, UNSPECIFIED CHRONICITY, UNSPECIFIED LATERALITY: Primary | ICD-10-CM

## 2020-06-16 PROCEDURE — 99213 OFFICE O/P EST LOW 20 MIN: CPT | Performed by: FAMILY MEDICINE

## 2020-06-16 NOTE — PROGRESS NOTES
Since nearly the beginning of May the patient has been developing slight redness and discomfort over the lateral surface of the left lateral malleolus. Since her birth and as a child she had developmental issues with her lower extremities.   She has Laisha

## 2020-06-18 ENCOUNTER — TELEPHONE (OUTPATIENT)
Dept: FAMILY MEDICINE CLINIC | Facility: CLINIC | Age: 55
End: 2020-06-18

## 2020-06-18 NOTE — TELEPHONE ENCOUNTER
Patient was seen on 6/16. Dr Enrriuqe Buckner told her to use Diflucan Gel 1% 4xs daily. She now states that just this morning there is a small open wound on area and is wondering if she should use the gel or not. Please advise.

## 2020-06-22 ENCOUNTER — OFFICE VISIT (OUTPATIENT)
Dept: FAMILY MEDICINE CLINIC | Facility: CLINIC | Age: 55
End: 2020-06-22
Payer: COMMERCIAL

## 2020-06-22 VITALS
WEIGHT: 121.19 LBS | HEIGHT: 68 IN | BODY MASS INDEX: 18.37 KG/M2 | RESPIRATION RATE: 16 BRPM | OXYGEN SATURATION: 99 % | HEART RATE: 75 BPM | SYSTOLIC BLOOD PRESSURE: 122 MMHG | DIASTOLIC BLOOD PRESSURE: 80 MMHG

## 2020-06-22 DIAGNOSIS — S91.052A DOG BITE OF LEFT ANKLE, INITIAL ENCOUNTER: Primary | ICD-10-CM
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
## 2020-06-22 PROCEDURE — 99213 OFFICE O/P EST LOW 20 MIN: CPT | Performed by: FAMILY MEDICINE

## 2020-06-22 NOTE — PROGRESS NOTES
Over the weekend this unfortunate woman had her left foot inadvertently attacked by 2 family dogs. It left a flap measuring 3 x 4 mm on the dorsal lateral aspect of the left foot the tendons were intact and spared.   Active passive range of motion in sensa

## 2020-07-16 ENCOUNTER — OFFICE VISIT (OUTPATIENT)
Dept: PODIATRY CLINIC | Facility: CLINIC | Age: 55
End: 2020-07-16
Payer: COMMERCIAL

## 2020-07-16 DIAGNOSIS — M89.8X7 EXOSTOSIS OF BONE OF ANKLE: Primary | ICD-10-CM

## 2020-07-16 PROCEDURE — 99203 OFFICE O/P NEW LOW 30 MIN: CPT | Performed by: PODIATRIST

## 2020-07-16 PROCEDURE — 73600 X-RAY EXAM OF ANKLE: CPT | Performed by: PODIATRIST

## 2020-07-16 NOTE — PROGRESS NOTES
Radha Smith is a 62year old female.  Patient presents with:  New Patient: left ankle - red and inflammed - denies pain when she walks, but its very tender when she lays on that side - pain scale 2/10        HPI:     Rubin Massey today with left ankle pain there no hyperplasia and/or malignancy noted   • Mitral valve disorders(424.0)    • Oral aphthae    • Pap smear for cervical cancer screening 01/16,11/13,02/11,10/9,08/08,05/07,02/06,02/05,11/03    negative   • Personal history of urinary (tract) infection headaches  MUSCULO: denies arthritis, back pain      EXAM:   There were no vitals taken for this visit. Physical Exam  GENERAL: well developed, well nourished, in no apparent distress  EXTREMITIES:   1. Integument: Normal skin temperature and turgor  2.  V

## 2020-11-28 ENCOUNTER — OFFICE VISIT (OUTPATIENT)
Dept: FAMILY MEDICINE CLINIC | Facility: CLINIC | Age: 55
End: 2020-11-28
Payer: COMMERCIAL

## 2020-11-28 VITALS
HEART RATE: 86 BPM | DIASTOLIC BLOOD PRESSURE: 87 MMHG | WEIGHT: 117 LBS | BODY MASS INDEX: 17.73 KG/M2 | RESPIRATION RATE: 16 BRPM | OXYGEN SATURATION: 99 % | TEMPERATURE: 98 F | SYSTOLIC BLOOD PRESSURE: 147 MMHG | HEIGHT: 68 IN

## 2020-11-28 DIAGNOSIS — Z20.822 EXPOSURE TO COVID-19 VIRUS: Primary | ICD-10-CM

## 2020-11-28 PROCEDURE — 3077F SYST BP >= 140 MM HG: CPT | Performed by: NURSE PRACTITIONER

## 2020-11-28 PROCEDURE — 99072 ADDL SUPL MATRL&STAF TM PHE: CPT | Performed by: NURSE PRACTITIONER

## 2020-11-28 PROCEDURE — 99213 OFFICE O/P EST LOW 20 MIN: CPT | Performed by: NURSE PRACTITIONER

## 2020-11-28 PROCEDURE — 3008F BODY MASS INDEX DOCD: CPT | Performed by: NURSE PRACTITIONER

## 2020-11-28 PROCEDURE — 3079F DIAST BP 80-89 MM HG: CPT | Performed by: NURSE PRACTITIONER

## 2020-11-28 NOTE — PROGRESS NOTES
CHIEF COMPLAINT:   Patient presents with:  Covid: exposure to covid-19.  tested + today. no sx      HPI:   Duane Velasquez is a 60year old female who presents for Covid 19 exposure 6 days ago. Reports no symptoms. Requesting covid testing.   At [place] • Fluticasone Propionate 50 MCG/ACT Nasal Suspension SHAKE WELL AND USE 2 SPRAYS IN EACH NOSTRIL DAILY (Patient not taking: Reported on 11/28/2020 ) 3 Bottle 1      Past Medical History:   Diagnosis Date   • Achilles bursitis or tendinitis    • Acne    • A SKIN: no rashes,no suspicious lesions  HEAD: atraumatic, normocephalic.     EYES: conjunctiva clear  EARS: TM's clear grey, no bulging, no retraction, no fluid, bony landmarks visualized  NOSE: Nostrils patent, no nasal discharge, nasal mucosa pink and non- · Stay home. Call your healthcare provider and tell them you have symptoms of COVID-19. Do this before going to any hospital or clinic. Follow your provider's instructions. You may be advised to isolate yourself at home. This is called self-isolation.  You · Stay home and start self-isolation. Don’t leave your home unless you need to get medical care. Don't go to work, school, or public areas. Don't use public transportation or taxis. · Follow all instructions from your healthcare provider.  Call your health There is currently no vaccine or medicine approved to prevent the virus. The FDA has approved an antiviral medicine called remdesivir for people in the hospital. It is for people 12 years and older who weigh more than about 88 pounds (40 kgs).  Remdesivir i If you've had confirmed COVID-19, your healthcare team may ask you to consider donating your plasma. This is called COVID-19 convalescent plasma donation.  Plasma from people fully recovered from COVID-19 may contain antibodies to help fight COVID-19 in peo Your limits are different if you've had COVID-19 in the last 3 months but are fully recovered without symptoms and you have been exposed to someone with COVID-19. If you are symptom-free, you don't need to stay home away from others or be retested.  The CDC When you return to public settings  When you are well enough to go outside your home, consider the CDC's guidance on cloth face masks:     · The CDC advises all people over age 2 to wear cloth face masks in public settings when around people outside of the © 1093-8977 [employer] [address]. All rights reserved. This information is not intended as a substitute for professional medical care. Always follow your healthcare professional's instructions.             The patient indicates understanding of these i

## 2020-11-28 NOTE — PATIENT INSTRUCTIONS
Coronavirus Disease 2019 (COVID-19): Caring for Yourself or Others   If you or a household member have symptoms of COVID-19, follow these guidelines for preventing spread of the virus, and managing symptoms.    If you think you have COVID-19 symptoms  · S · Tell the healthcare staff about recent travel. This includes local travel on public transport. Staff may need to find other people you have been in contact with. · Follow all instructions the healthcare staff give you.     If you have been diagnosed with There is currently no vaccine or medicine approved to prevent the virus. The FDA has approved an antiviral medicine called remdesivir for people in the hospital. It is for people 12 years and older who weigh more than about 88 pounds (40 kgs).  Remdesivir i If you've had confirmed COVID-19, your healthcare team may ask you to consider donating your plasma. This is called COVID-19 convalescent plasma donation.  Plasma from people fully recovered from COVID-19 may contain antibodies to help fight COVID-19 in peo Your limits are different if you've had COVID-19 in the last 3 months but are fully recovered without symptoms and you have been exposed to someone with COVID-19. If you are symptom-free, you don't need to stay home away from others or be retested.  The CDC When you return to public settings  When you are well enough to go outside your home, consider the CDC's guidance on cloth face masks:     · The CDC advises all people over age 2 to wear cloth face masks in public settings when around people outside of the © 6752-2301 [employer] [address]. All rights reserved. This information is not intended as a substitute for professional medical care. Always follow your healthcare professional's instructions.

## 2020-12-02 ENCOUNTER — TELEMEDICINE (OUTPATIENT)
Dept: FAMILY MEDICINE CLINIC | Facility: CLINIC | Age: 55
End: 2020-12-02
Payer: COMMERCIAL

## 2020-12-02 ENCOUNTER — APPOINTMENT (OUTPATIENT)
Dept: LAB | Age: 55
End: 2020-12-02
Attending: PHYSICIAN ASSISTANT
Payer: COMMERCIAL

## 2020-12-02 DIAGNOSIS — Z20.822 SUSPECTED COVID-19 VIRUS INFECTION: ICD-10-CM

## 2020-12-02 DIAGNOSIS — Z20.822 SUSPECTED COVID-19 VIRUS INFECTION: Primary | ICD-10-CM

## 2020-12-02 DIAGNOSIS — Z20.822 CLOSE EXPOSURE TO COVID-19 VIRUS: ICD-10-CM

## 2020-12-02 PROCEDURE — 99213 OFFICE O/P EST LOW 20 MIN: CPT | Performed by: PHYSICIAN ASSISTANT

## 2020-12-02 RX ORDER — AZITHROMYCIN 250 MG/1
TABLET, FILM COATED ORAL
Qty: 6 TABLET | Refills: 0 | Status: SHIPPED | OUTPATIENT
Start: 2020-12-02 | End: 2020-12-07

## 2020-12-02 NOTE — PROGRESS NOTES
Virtual Telephone Check-In    Carissa Paniagua verbally consents to a Virtual/Telephone Check-In visit on 12/02/20. Patient has been referred to the [facility] website at [email or web address] to review the yearly Consent to Treat document.     Patient Nicko Cespedes chronic    • H/O mammogram 06/18,10/15,01/14,11/10,10/09,09/08    benign   • H/O mammogram 10/15, 01/14    negative   • History of endometrial biopsy 02/03/2011    no hyperplasia and/or malignancy noted   • Mitral valve disorders(424.0)    • Oral aphthae to COVID-19 since she is in close proximity to her positive  and is. We discussed other possibilities including influenza or other viral etiology.   She is quite concerned about the axillary and right shoulder blade pain given her history of pleuris may have been spent reviewing labs, medications, radiology tests and decision making. Appropriate medical decision-making and tests are ordered as detailed in the plan of care above.   Coding/billing information is submitted for this visit based on complexi

## 2020-12-03 ENCOUNTER — TELEMEDICINE (OUTPATIENT)
Dept: FAMILY MEDICINE CLINIC | Facility: CLINIC | Age: 55
End: 2020-12-03
Payer: COMMERCIAL

## 2020-12-03 ENCOUNTER — TELEPHONE (OUTPATIENT)
Dept: FAMILY MEDICINE CLINIC | Facility: CLINIC | Age: 55
End: 2020-12-03

## 2020-12-03 DIAGNOSIS — Z20.822 SUSPECTED COVID-19 VIRUS INFECTION: Primary | ICD-10-CM

## 2020-12-03 PROCEDURE — 99213 OFFICE O/P EST LOW 20 MIN: CPT | Performed by: PHYSICIAN ASSISTANT

## 2020-12-03 RX ORDER — ALBUTEROL SULFATE 90 UG/1
1-2 AEROSOL, METERED RESPIRATORY (INHALATION) EVERY 6 HOURS PRN
Qty: 1 INHALER | Refills: 0 | Status: SHIPPED | OUTPATIENT
Start: 2020-12-03 | End: 2022-01-27

## 2020-12-04 ENCOUNTER — TELEPHONE (OUTPATIENT)
Dept: FAMILY MEDICINE CLINIC | Facility: CLINIC | Age: 55
End: 2020-12-04

## 2020-12-04 ENCOUNTER — HOSPITAL ENCOUNTER (OUTPATIENT)
Dept: GENERAL RADIOLOGY | Age: 55
Discharge: HOME | End: 2020-12-04
Attending: PHYSICIAN ASSISTANT
Payer: COMMERCIAL

## 2020-12-04 DIAGNOSIS — Z20.822 SUSPECTED COVID-19 VIRUS INFECTION: ICD-10-CM

## 2020-12-04 PROCEDURE — 71046 X-RAY EXAM CHEST 2 VIEWS: CPT | Performed by: PHYSICIAN ASSISTANT

## 2020-12-04 NOTE — PROGRESS NOTES
Video Visit    Chloe Bahena is a 60year old female. No chief complaint on file. HPI:   Presents today in follow-up of our visit yesterday. She is suspected to have Covid given her symptoms and her close exposure to her positive Covid has been.   Sh H/O mammogram 06/18,10/15,01/14,11/10,10/09,09/08    benign   • H/O mammogram 10/15, 01/14    negative   • History of endometrial biopsy 02/03/2011    no hyperplasia and/or malignancy noted   • Mitral valve disorders(424.0)    • Oral aphthae    • Pap smear visit yesterday she has had new onset elevated temperature up to 99.7 and O2 sat averaging 93% which is lower than yesterday.   93% may be reflective of the nail polish of the fingernails however she had nail polish on yesterday and her readings were much b were in [place]. Included in this visit, time may have been spent reviewing labs, medications, radiology tests and decision making. Appropriate medical decision-making and tests are ordered as detailed in the plan of care above.   Coding/billing informat

## 2020-12-04 NOTE — TELEPHONE ENCOUNTER
PT WANTS TO SPEAK TO ROMAN REGARDING HER TEST RESULTS     THEY WERE RELEASED TO [place] TODAY

## 2020-12-04 NOTE — TELEPHONE ENCOUNTER
Called patient and discussed results in detail. She feels physically better today, her oxygen saturation has been 95%. No new symptoms and fever has resolved.

## 2020-12-05 DIAGNOSIS — I77.1 TORTUOUS AORTA (HCC): Primary | ICD-10-CM

## 2020-12-05 DIAGNOSIS — I77.810 AORTIC ECTASIA, THORACIC (HCC): ICD-10-CM

## 2020-12-07 ENCOUNTER — VIRTUAL PHONE E/M (OUTPATIENT)
Dept: FAMILY MEDICINE CLINIC | Facility: CLINIC | Age: 55
End: 2020-12-07
Payer: COMMERCIAL

## 2020-12-07 ENCOUNTER — TELEPHONE (OUTPATIENT)
Dept: FAMILY MEDICINE CLINIC | Facility: CLINIC | Age: 55
End: 2020-12-07

## 2020-12-07 DIAGNOSIS — M79.662 PAIN OF LEFT CALF: Primary | ICD-10-CM

## 2020-12-07 DIAGNOSIS — U07.1 COVID-19 VIRUS INFECTION: Primary | ICD-10-CM

## 2020-12-07 DIAGNOSIS — M79.662 PAIN OF LEFT CALF: ICD-10-CM

## 2020-12-07 PROCEDURE — 99213 OFFICE O/P EST LOW 20 MIN: CPT | Performed by: PHYSICIAN ASSISTANT

## 2020-12-07 NOTE — PROGRESS NOTES
Virtual Telephone Check-In    Roc Waterman verbally consents to a Virtual/Telephone Check-In visit on 12/07/20. Patient has been referred to the [facility] website at [email or web address] to review the yearly Consent to Treat document.     Patient Charlotte

## 2021-04-13 ENCOUNTER — OFFICE VISIT (OUTPATIENT)
Dept: OBGYN CLINIC | Facility: CLINIC | Age: 56
End: 2021-04-13
Payer: COMMERCIAL

## 2021-04-13 ENCOUNTER — TELEPHONE (OUTPATIENT)
Dept: OBGYN CLINIC | Facility: CLINIC | Age: 56
End: 2021-04-13

## 2021-04-13 VITALS
SYSTOLIC BLOOD PRESSURE: 128 MMHG | BODY MASS INDEX: 18.19 KG/M2 | DIASTOLIC BLOOD PRESSURE: 80 MMHG | HEIGHT: 68 IN | HEART RATE: 86 BPM | WEIGHT: 120 LBS

## 2021-04-13 DIAGNOSIS — Z12.31 VISIT FOR SCREENING MAMMOGRAM: ICD-10-CM

## 2021-04-13 DIAGNOSIS — Z01.419 WELL WOMAN EXAM WITH ROUTINE GYNECOLOGICAL EXAM: ICD-10-CM

## 2021-04-13 DIAGNOSIS — Z12.31 ENCOUNTER FOR SCREENING MAMMOGRAM FOR BREAST CANCER: Primary | ICD-10-CM

## 2021-04-13 PROCEDURE — 3008F BODY MASS INDEX DOCD: CPT | Performed by: OBSTETRICS & GYNECOLOGY

## 2021-04-13 PROCEDURE — 3074F SYST BP LT 130 MM HG: CPT | Performed by: OBSTETRICS & GYNECOLOGY

## 2021-04-13 PROCEDURE — 3079F DIAST BP 80-89 MM HG: CPT | Performed by: OBSTETRICS & GYNECOLOGY

## 2021-04-13 PROCEDURE — 99396 PREV VISIT EST AGE 40-64: CPT | Performed by: OBSTETRICS & GYNECOLOGY

## 2021-04-13 NOTE — PROGRESS NOTES
Nena Rendon is a 60year old female [id number] Patient's last menstrual period was 10/19/2015 (exact date). Patient presents with:  Wellness Visit  . No bleeding no spotting a little bit of vaginal dryness she is good with K-Y jelly.   No colon cancer in file      Number of children: Not on file      Years of education: Not on file      Highest education level: Not on file    Occupational History      Not on file    Tobacco Use      Smoking status: Never Smoker      Smokeless tobacco: Never Used    Vaping Problem Relation Age of Onset   • Hypertension Father    • Other (rheumatoid arthritis) Father    • Thyroid Disorder Mother    • No Known Problems Maternal Grandmother    • No Known Problems Maternal Grandfather    • No Known Problems Paternal Grandmothe no masses  Skin/Hair: no unusual rashes or bruises   Extremities: no edema, no cyanosis  Psychiatric:  Oriented to time, place, person and situation.  Appropriate mood and affect    Pelvic Exam:  External Genitalia: normal appearance, hair distribution, and

## 2021-05-18 ENCOUNTER — OFFICE VISIT (OUTPATIENT)
Dept: FAMILY MEDICINE CLINIC | Facility: CLINIC | Age: 56
End: 2021-05-18

## 2021-05-18 ENCOUNTER — HOSPITAL ENCOUNTER (OUTPATIENT)
Age: 56
Discharge: HOME | End: 2021-05-18
Payer: COMMERCIAL

## 2021-05-18 VITALS
TEMPERATURE: 98 F | DIASTOLIC BLOOD PRESSURE: 97 MMHG | RESPIRATION RATE: 16 BRPM | SYSTOLIC BLOOD PRESSURE: 146 MMHG | HEART RATE: 71 BPM | OXYGEN SATURATION: 99 %

## 2021-05-18 DIAGNOSIS — Z51.89 VISIT FOR WOUND CHECK: Primary | ICD-10-CM

## 2021-05-18 DIAGNOSIS — Z02.9 ADMINISTRATIVE ENCOUNTER: Primary | ICD-10-CM

## 2021-05-18 PROCEDURE — 99212 OFFICE O/P EST SF 10 MIN: CPT

## 2021-05-18 NOTE — ED INITIAL ASSESSMENT (HPI)
C/o has blistered area x 1 week from rubbing shoes to of the left foot. Foot is swollen. Scabbed area is  hard, red and has pressure.

## 2021-05-18 NOTE — PROGRESS NOTES
HPI/Subjective:   Patient ID: Lazara Estrada is a 60year old female. States has blistered area x 1 week from rubbing shoes Located left foot. States wound is swollen, hard, red and has pressure.  Referred to IC      History/Other:   Review of Systems  Cu

## 2021-05-18 NOTE — ED PROVIDER NOTES
Patient Seen in: [facility]      History   Patient presents with:  Derm Problem    Stated Complaint: [facility] States has red hard, swollen area top of foot.  States rubbed blister fro*    HPI/Subjective:   HPI  Patient presents to the urgent pain, nausea/vomiting/diarrhea.     Extremities: Denies injury, trauma or decreased movement  : Denies dysuria frequency or urgency, denies discharge  MSK: Denies loss of strength, injury  Neuro: Denies syncope, loss of balance, weakness  Integumentary: D soap and water patting dry, applying antibiotic ointment to the sites, and a light gauze dressing. Patient educated to monitor for any increased redness swelling or pain.   Patient is currently on amoxicillin daily at this time given the look of the foot t

## 2022-01-27 ENCOUNTER — OFFICE VISIT (OUTPATIENT)
Dept: FAMILY MEDICINE CLINIC | Facility: CLINIC | Age: 57
End: 2022-01-27
Payer: COMMERCIAL

## 2022-01-27 VITALS
RESPIRATION RATE: 20 BRPM | SYSTOLIC BLOOD PRESSURE: 126 MMHG | OXYGEN SATURATION: 98 % | DIASTOLIC BLOOD PRESSURE: 80 MMHG | WEIGHT: 123 LBS | BODY MASS INDEX: 18.64 KG/M2 | HEIGHT: 68 IN | TEMPERATURE: 97 F | HEART RATE: 89 BPM

## 2022-01-27 DIAGNOSIS — M17.11 LOCALIZED OSTEOARTHRITIS OF RIGHT KNEE: Primary | ICD-10-CM

## 2022-01-27 DIAGNOSIS — Q80.9 ICHTHYOSIS: ICD-10-CM

## 2022-01-27 PROCEDURE — 3079F DIAST BP 80-89 MM HG: CPT | Performed by: INTERNAL MEDICINE

## 2022-01-27 PROCEDURE — 3008F BODY MASS INDEX DOCD: CPT | Performed by: INTERNAL MEDICINE

## 2022-01-27 PROCEDURE — 99214 OFFICE O/P EST MOD 30 MIN: CPT | Performed by: INTERNAL MEDICINE

## 2022-01-27 PROCEDURE — 3074F SYST BP LT 130 MM HG: CPT | Performed by: INTERNAL MEDICINE

## 2022-01-27 RX ORDER — DICLOFENAC SODIUM 16.05 MG/ML
1 SOLUTION TOPICAL 3 TIMES DAILY PRN
Qty: 150 ML | Refills: 0 | Status: SHIPPED | OUTPATIENT
Start: 2022-01-27

## 2022-01-27 NOTE — PROGRESS NOTES
Gila Lara is a 60year old female. HPI:   C/o acute episode of right knee pain  2013 first knee flare up and diagnosed with osteoarthritis. Prescribed Diclofenac solution which worked very well.  Since then on occasion she would only need to use Diclof 7.0 standard drinks      Types: 7 Standard drinks or equivalent per week    Drug use: No       REVIEW OF SYSTEMS:   GENERAL HEALTH: feels well otherwise  SKIN: ++ dry skin, +  scratching, no rash  RESPIRATORY: denies shortness of breath or cough  CARDIOVAS

## 2022-02-17 ENCOUNTER — OFFICE VISIT (OUTPATIENT)
Dept: FAMILY MEDICINE CLINIC | Facility: CLINIC | Age: 57
End: 2022-02-17
Payer: COMMERCIAL

## 2022-02-17 VITALS
DIASTOLIC BLOOD PRESSURE: 79 MMHG | RESPIRATION RATE: 18 BRPM | OXYGEN SATURATION: 96 % | HEART RATE: 85 BPM | SYSTOLIC BLOOD PRESSURE: 130 MMHG

## 2022-02-17 DIAGNOSIS — L21.9 SEBORRHEIC DERMATITIS OF SCALP: Primary | ICD-10-CM

## 2022-02-17 PROCEDURE — 99213 OFFICE O/P EST LOW 20 MIN: CPT | Performed by: PHYSICIAN ASSISTANT

## 2022-02-17 PROCEDURE — 3078F DIAST BP <80 MM HG: CPT | Performed by: PHYSICIAN ASSISTANT

## 2022-02-17 PROCEDURE — 3075F SYST BP GE 130 - 139MM HG: CPT | Performed by: PHYSICIAN ASSISTANT

## 2022-02-17 RX ORDER — KETOCONAZOLE 20 MG/ML
SHAMPOO TOPICAL
Qty: 120 ML | Refills: 2 | Status: SHIPPED | OUTPATIENT
Start: 2022-02-17

## 2022-02-18 ENCOUNTER — TELEPHONE (OUTPATIENT)
Dept: FAMILY MEDICINE CLINIC | Facility: CLINIC | Age: 57
End: 2022-02-18

## 2022-02-18 RX ORDER — TRIAMCINOLONE ACETONIDE 1 MG/G
CREAM TOPICAL
Qty: 60 G | Refills: 0 | Status: SHIPPED | OUTPATIENT
Start: 2022-02-18 | End: 2023-01-20

## 2022-02-18 NOTE — TELEPHONE ENCOUNTER
Pt had appt yesterday and forgot to ask for refill on the cream for her rash - pls send to [facility] [address]

## 2022-03-17 ENCOUNTER — OFFICE VISIT (OUTPATIENT)
Dept: FAMILY MEDICINE CLINIC | Facility: CLINIC | Age: 57
End: 2022-03-17
Payer: COMMERCIAL

## 2022-03-17 VITALS
WEIGHT: 122.38 LBS | RESPIRATION RATE: 20 BRPM | SYSTOLIC BLOOD PRESSURE: 128 MMHG | HEIGHT: 68 IN | HEART RATE: 72 BPM | DIASTOLIC BLOOD PRESSURE: 80 MMHG | BODY MASS INDEX: 18.55 KG/M2

## 2022-03-17 DIAGNOSIS — R23.8 SCALP IRRITATION: Primary | ICD-10-CM

## 2022-03-17 PROCEDURE — 99213 OFFICE O/P EST LOW 20 MIN: CPT | Performed by: PHYSICIAN ASSISTANT

## 2022-03-17 PROCEDURE — 3074F SYST BP LT 130 MM HG: CPT | Performed by: PHYSICIAN ASSISTANT

## 2022-03-17 PROCEDURE — 3079F DIAST BP 80-89 MM HG: CPT | Performed by: PHYSICIAN ASSISTANT

## 2022-03-17 PROCEDURE — 3008F BODY MASS INDEX DOCD: CPT | Performed by: PHYSICIAN ASSISTANT

## 2022-03-17 RX ORDER — HYDROXYZINE HYDROCHLORIDE 25 MG/1
25 TABLET, FILM COATED ORAL 3 TIMES DAILY PRN
Qty: 45 TABLET | Refills: 0 | Status: SHIPPED | OUTPATIENT
Start: 2022-03-17

## 2022-05-05 ENCOUNTER — TELEPHONE (OUTPATIENT)
Dept: FAMILY MEDICINE CLINIC | Facility: CLINIC | Age: 57
End: 2022-05-05

## 2022-05-05 ENCOUNTER — TELEMEDICINE (OUTPATIENT)
Dept: FAMILY MEDICINE CLINIC | Facility: CLINIC | Age: 57
End: 2022-05-05
Payer: COMMERCIAL

## 2022-05-05 DIAGNOSIS — U07.1 COVID-19: Primary | ICD-10-CM

## 2022-05-05 PROCEDURE — 99213 OFFICE O/P EST LOW 20 MIN: CPT | Performed by: FAMILY MEDICINE

## 2022-05-05 RX ORDER — BENZONATATE 200 MG/1
200 CAPSULE ORAL EVERY 8 HOURS PRN
Qty: 30 CAPSULE | Refills: 0 | Status: SHIPPED | OUTPATIENT
Start: 2022-05-05

## 2022-05-05 RX ORDER — FLUTICASONE PROPIONATE 50 MCG
2 SPRAY, SUSPENSION (ML) NASAL DAILY
Qty: 1 EACH | Refills: 1 | Status: SHIPPED | OUTPATIENT
Start: 2022-05-05 | End: 2022-05-06

## 2022-05-05 RX ORDER — FLUTICASONE PROPIONATE 50 MCG
SPRAY, SUSPENSION (ML) NASAL
Qty: 48 G | Refills: 0 | OUTPATIENT
Start: 2022-05-05

## 2022-05-05 NOTE — TELEPHONE ENCOUNTER
Spoke with pharmacist at [facility], she states she does not have the nirmatrelvir & ritonavir, but the [facility] [address] has it in stock and can use the order in the centralized [facility] system, patient notified, verbalized understanding.

## 2022-05-06 RX ORDER — FLUTICASONE PROPIONATE 50 MCG
SPRAY, SUSPENSION (ML) NASAL
Qty: 48 G | Refills: 0 | Status: SHIPPED | OUTPATIENT
Start: 2022-05-06

## 2022-06-01 ENCOUNTER — OFFICE VISIT (OUTPATIENT)
Dept: FAMILY MEDICINE CLINIC | Facility: CLINIC | Age: 57
End: 2022-06-01
Payer: COMMERCIAL

## 2022-06-01 VITALS
OXYGEN SATURATION: 95 % | HEIGHT: 68 IN | SYSTOLIC BLOOD PRESSURE: 120 MMHG | RESPIRATION RATE: 16 BRPM | HEART RATE: 91 BPM | WEIGHT: 124.38 LBS | DIASTOLIC BLOOD PRESSURE: 80 MMHG | BODY MASS INDEX: 18.85 KG/M2

## 2022-06-01 DIAGNOSIS — S86.899A ANTERIOR SHIN SPLINTS: Primary | ICD-10-CM
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
## 2022-06-01 PROBLEM — S91.052A: Status: RESOLVED | Noted: 2020-06-22 | Resolved: 2022-06-01

## 2022-06-01 PROBLEM — J32.9 OTHER SINUSITIS: Status: RESOLVED | Noted: 2017-07-05 | Resolved: 2022-06-01

## 2022-06-01 PROBLEM — Z01.419 WELL WOMAN EXAM WITH ROUTINE GYNECOLOGICAL EXAM: Status: RESOLVED | Noted: 2017-04-24 | Resolved: 2022-06-01

## 2022-06-01 PROCEDURE — 3079F DIAST BP 80-89 MM HG: CPT | Performed by: FAMILY MEDICINE

## 2022-06-01 PROCEDURE — 3074F SYST BP LT 130 MM HG: CPT | Performed by: FAMILY MEDICINE

## 2022-06-01 PROCEDURE — 99213 OFFICE O/P EST LOW 20 MIN: CPT | Performed by: FAMILY MEDICINE

## 2022-06-01 PROCEDURE — 3008F BODY MASS INDEX DOCD: CPT | Performed by: FAMILY MEDICINE

## 2022-06-01 NOTE — PATIENT INSTRUCTIONS
Use 600 mg of ibuprofen 3 times a day. Activities off your feet will be better tolerated such as swimming, cycling, elliptical machine.

## 2022-06-02 ENCOUNTER — TELEPHONE (OUTPATIENT)
Dept: OBGYN CLINIC | Facility: CLINIC | Age: 57
End: 2022-06-02

## 2022-06-02 ENCOUNTER — OFFICE VISIT (OUTPATIENT)
Dept: OBGYN CLINIC | Facility: CLINIC | Age: 57
End: 2022-06-02
Payer: COMMERCIAL

## 2022-06-02 VITALS
HEART RATE: 74 BPM | DIASTOLIC BLOOD PRESSURE: 80 MMHG | WEIGHT: 123 LBS | HEIGHT: 68 IN | SYSTOLIC BLOOD PRESSURE: 120 MMHG | BODY MASS INDEX: 18.64 KG/M2

## 2022-06-02 DIAGNOSIS — Z12.4 PAPANICOLAOU SMEAR FOR CERVICAL CANCER SCREENING: Primary | ICD-10-CM

## 2022-06-02 DIAGNOSIS — Z78.0 POSTMENOPAUSAL STATUS, AGE-RELATED: ICD-10-CM

## 2022-06-02 DIAGNOSIS — Z12.31 ENCOUNTER FOR SCREENING MAMMOGRAM FOR BREAST CANCER: ICD-10-CM

## 2022-06-02 DIAGNOSIS — Z01.419 WELL WOMAN EXAM WITH ROUTINE GYNECOLOGICAL EXAM: ICD-10-CM

## 2022-06-02 PROCEDURE — 87624 HPV HI-RISK TYP POOLED RSLT: CPT | Performed by: OBSTETRICS & GYNECOLOGY

## 2022-06-02 PROCEDURE — 99396 PREV VISIT EST AGE 40-64: CPT | Performed by: OBSTETRICS & GYNECOLOGY

## 2022-06-02 PROCEDURE — 3008F BODY MASS INDEX DOCD: CPT | Performed by: OBSTETRICS & GYNECOLOGY

## 2022-06-02 PROCEDURE — 3074F SYST BP LT 130 MM HG: CPT | Performed by: OBSTETRICS & GYNECOLOGY

## 2022-06-02 PROCEDURE — 3079F DIAST BP 80-89 MM HG: CPT | Performed by: OBSTETRICS & GYNECOLOGY

## 2022-06-02 NOTE — TELEPHONE ENCOUNTER
Bone density and mammogram order faxed to [facility] per KD,pt aware

## 2022-06-09 LAB — LAST PAP RESULT: NORMAL

## 2022-06-10 ENCOUNTER — TELEPHONE (OUTPATIENT)
Dept: FAMILY MEDICINE CLINIC | Facility: CLINIC | Age: 57
End: 2022-06-10

## 2022-06-10 DIAGNOSIS — Z01.419 ENCOUNTER FOR ANNUAL ROUTINE GYNECOLOGICAL EXAMINATION: Primary | ICD-10-CM

## 2022-06-10 LAB — HPV I/H RISK 1 DNA SPEC QL NAA+PROBE: NEGATIVE

## 2022-06-10 NOTE — TELEPHONE ENCOUNTER
Last OV: 6/1/22  Last refill:   FLUTICASONE PROPIONATE 50 MCG/ACT Nasal Suspension 48 g 0 5/6/2022     Sig: SHAKE LIQUID AND USE 2 SPRAYS IN EACH NOSTRIL DAILY      Spoke with pharmacy, they are preparing fluticasone that was sent on 5/6/22 and never filled.

## 2022-06-22 ENCOUNTER — OFFICE VISIT (OUTPATIENT)
Dept: FAMILY MEDICINE CLINIC | Facility: CLINIC | Age: 57
End: 2022-06-22
Payer: COMMERCIAL

## 2022-06-22 ENCOUNTER — TELEPHONE (OUTPATIENT)
Dept: OBGYN CLINIC | Facility: CLINIC | Age: 57
End: 2022-06-22

## 2022-06-22 VITALS
SYSTOLIC BLOOD PRESSURE: 106 MMHG | RESPIRATION RATE: 18 BRPM | OXYGEN SATURATION: 98 % | TEMPERATURE: 99 F | HEIGHT: 68 IN | HEART RATE: 83 BPM | WEIGHT: 118 LBS | DIASTOLIC BLOOD PRESSURE: 70 MMHG | BODY MASS INDEX: 17.88 KG/M2

## 2022-06-22 DIAGNOSIS — R39.9 UTI SYMPTOMS: Primary | ICD-10-CM

## 2022-06-22 LAB
BILIRUBIN: NEGATIVE
GLUCOSE (URINE DIPSTICK): 100 MG/DL
KETONES (URINE DIPSTICK): NEGATIVE MG/DL
MULTISTIX LOT#: ABNORMAL NUMERIC
NITRITE, URINE: POSITIVE
PH, URINE: 6.5 (ref 4.5–8)
PROTEIN (URINE DIPSTICK): NEGATIVE MG/DL
SPECIFIC GRAVITY: 1.01 (ref 1–1.03)
UROBILINOGEN,SEMI-QN: 0.2 MG/DL (ref 0–1.9)

## 2022-06-22 PROCEDURE — 3008F BODY MASS INDEX DOCD: CPT | Performed by: NURSE PRACTITIONER

## 2022-06-22 PROCEDURE — 81003 URINALYSIS AUTO W/O SCOPE: CPT | Performed by: NURSE PRACTITIONER

## 2022-06-22 PROCEDURE — 3078F DIAST BP <80 MM HG: CPT | Performed by: NURSE PRACTITIONER

## 2022-06-22 PROCEDURE — 87086 URINE CULTURE/COLONY COUNT: CPT | Performed by: NURSE PRACTITIONER

## 2022-06-22 PROCEDURE — 87088 URINE BACTERIA CULTURE: CPT | Performed by: NURSE PRACTITIONER

## 2022-06-22 PROCEDURE — 99213 OFFICE O/P EST LOW 20 MIN: CPT | Performed by: NURSE PRACTITIONER

## 2022-06-22 PROCEDURE — 3074F SYST BP LT 130 MM HG: CPT | Performed by: NURSE PRACTITIONER

## 2022-06-22 PROCEDURE — 87186 SC STD MICRODIL/AGAR DIL: CPT | Performed by: NURSE PRACTITIONER

## 2022-06-22 RX ORDER — NITROFURANTOIN 25; 75 MG/1; MG/1
100 CAPSULE ORAL 2 TIMES DAILY
Qty: 14 CAPSULE | Refills: 0 | Status: SHIPPED | OUTPATIENT
Start: 2022-06-22 | End: 2022-06-29

## 2022-10-15 ENCOUNTER — TELEPHONE (OUTPATIENT)
Dept: FAMILY MEDICINE CLINIC | Facility: CLINIC | Age: 57
End: 2022-10-15

## 2022-10-15 DIAGNOSIS — R39.9 UTI SYMPTOMS: Primary | ICD-10-CM

## 2022-10-15 DIAGNOSIS — R39.9 UTI SYMPTOMS: ICD-10-CM

## 2022-10-15 RX ORDER — NITROFURANTOIN 25; 75 MG/1; MG/1
100 CAPSULE ORAL 2 TIMES DAILY
Qty: 10 CAPSULE | Refills: 0 | Status: SHIPPED | OUTPATIENT
Start: 2022-10-15 | End: 2022-10-15

## 2022-10-15 RX ORDER — NITROFURANTOIN 25; 75 MG/1; MG/1
100 CAPSULE ORAL 2 TIMES DAILY
Qty: 10 CAPSULE | Refills: 0 | Status: SHIPPED | OUTPATIENT
Start: 2022-10-15

## 2022-10-15 NOTE — TELEPHONE ENCOUNTER
Catherine Johnathon calls in with UTI symptoms, she gets frequent UTIs. Last culture did not show resistance for macrobid. She is currently in [place]. She gets 3-4 a year until recently has been a year. But this feels like her typical UTI.     She is limited in her ability to be seen there    I will send in course of Tonie Preston MD

## 2022-11-07 ENCOUNTER — TELEPHONE (OUTPATIENT)
Dept: FAMILY MEDICINE CLINIC | Facility: CLINIC | Age: 57
End: 2022-11-07

## 2022-11-07 DIAGNOSIS — R39.9 UTI SYMPTOMS: ICD-10-CM

## 2022-11-07 RX ORDER — NITROFURANTOIN 25; 75 MG/1; MG/1
100 CAPSULE ORAL 2 TIMES DAILY
Qty: 14 CAPSULE | Refills: 0 | Status: SHIPPED | OUTPATIENT
Start: 2022-11-07

## 2022-11-07 NOTE — TELEPHONE ENCOUNTER
Pt given Macrobid on 10//15/22 x 5 days, however she states she usually needs 7-14 days to knock out her symptoms. She states after 5 days it got slightly better, but now her symptoms have returned. Frequency, burning  Do you want to prescribe another round of antibiotics? Urine culture?  Please advise

## 2022-11-07 NOTE — TELEPHONE ENCOUNTER
I can send in another course, if she is currently local a UA and culture could be useful. Otherwise can send in 7 day course of bactrim ds.     Jacob Chauhan MD

## 2022-11-07 NOTE — TELEPHONE ENCOUNTER
Pt is not able to do u/a cx today, she is traveling,, will be back tomorrow. Send rx to [facility] [address]  Does not want bactrim, would like extension on macrobid    States she also used to be on preventative macrobid for post intercourse to prevent uti's since she gets them often. Will you send in more macrobid?

## 2022-11-07 NOTE — TELEPHONE ENCOUNTER
I can send in [address], would recommend an appointment if wanting consider starting prophylaxis.     Of note I've only spoken to [facility] while on call, and have never seen in clinic    Carletta Collet, MD

## 2022-11-11 ENCOUNTER — TELEPHONE (OUTPATIENT)
Dept: FAMILY MEDICINE CLINIC | Facility: CLINIC | Age: 57
End: 2022-11-11

## 2022-11-11 RX ORDER — CIPROFLOXACIN 500 MG/1
500 TABLET, FILM COATED ORAL 2 TIMES DAILY
Qty: 10 TABLET | Refills: 0 | Status: SHIPPED | OUTPATIENT
Start: 2022-11-11

## 2022-11-11 NOTE — TELEPHONE ENCOUNTER
Pt has been on macrobid for 4 days for UTI sx and states sx are not improving and now pt c/o lower back discomfort. Pt states she does not want to go to [facility]. Please advise. No appt availability.

## 2022-11-11 NOTE — TELEPHONE ENCOUNTER
Pt informed and verbalized understanding. Pt states she cannot take Bactrim and would like the Cipro. Script to [address]. Pt informed if no improvement or worsening sx to go to [facility]. Pt verbalized understanding.

## 2022-11-11 NOTE — TELEPHONE ENCOUNTER
My typical response would be to start bactrim or ciprofloxacin for presumed resistant organism.     Bactrim DS 2 x BID for 5 days    Or if she still wishes to avoid bactrim then    Cipro 500 mg BID x 5 days        Deisy Mclaughlin MD

## 2023-01-16 ENCOUNTER — OFFICE VISIT (OUTPATIENT)
Dept: FAMILY MEDICINE CLINIC | Facility: CLINIC | Age: 58
End: 2023-01-16
Payer: COMMERCIAL

## 2023-01-16 ENCOUNTER — TELEPHONE (OUTPATIENT)
Dept: FAMILY MEDICINE CLINIC | Facility: CLINIC | Age: 58
End: 2023-01-16

## 2023-01-16 VITALS
BODY MASS INDEX: 18 KG/M2 | SYSTOLIC BLOOD PRESSURE: 112 MMHG | DIASTOLIC BLOOD PRESSURE: 70 MMHG | OXYGEN SATURATION: 97 % | HEART RATE: 78 BPM | HEIGHT: 68 IN | RESPIRATION RATE: 18 BRPM

## 2023-01-16 DIAGNOSIS — R07.81 RIB PAIN: Primary | ICD-10-CM

## 2023-01-16 DIAGNOSIS — R13.10 DYSPHAGIA, UNSPECIFIED TYPE: ICD-10-CM

## 2023-01-16 PROCEDURE — 3074F SYST BP LT 130 MM HG: CPT | Performed by: FAMILY MEDICINE

## 2023-01-16 PROCEDURE — 3078F DIAST BP <80 MM HG: CPT | Performed by: FAMILY MEDICINE

## 2023-01-16 PROCEDURE — 99213 OFFICE O/P EST LOW 20 MIN: CPT | Performed by: FAMILY MEDICINE

## 2023-01-16 NOTE — TELEPHONE ENCOUNTER
Pt choked on some food on Friday and received the heimlich maneuver. She's having some rib pain and some SOB for 4 days - pt wants to discuss with nurse as we do not have openings until Thursday - pt does not think she needs to go to an urgent care or ER.

## 2023-01-30 ENCOUNTER — TELEPHONE (OUTPATIENT)
Dept: FAMILY MEDICINE CLINIC | Facility: CLINIC | Age: 58
End: 2023-01-30

## 2023-01-30 DIAGNOSIS — R13.10 DYSPHAGIA, UNSPECIFIED TYPE: Primary | ICD-10-CM

## 2023-01-30 NOTE — TELEPHONE ENCOUNTER
Pt tried to sched her speech therapy and was told a swallow study needs to be done.   pls order and advise pt

## 2023-04-11 ENCOUNTER — LAB ENCOUNTER (OUTPATIENT)
Dept: LAB | Age: 58
End: 2023-04-11
Attending: FAMILY MEDICINE
Payer: COMMERCIAL

## 2023-04-11 DIAGNOSIS — R13.10 DYSPHAGIA, UNSPECIFIED TYPE: ICD-10-CM

## 2023-04-12 LAB — SARS-COV-2 RNA RESP QL NAA+PROBE: DETECTED

## 2023-05-31 ENCOUNTER — TELEPHONE (OUTPATIENT)
Dept: FAMILY MEDICINE CLINIC | Facility: CLINIC | Age: 58
End: 2023-05-31

## 2023-05-31 RX ORDER — NITROFURANTOIN 25; 75 MG/1; MG/1
100 CAPSULE ORAL 2 TIMES DAILY
Qty: 14 CAPSULE | Refills: 0 | Status: SHIPPED | OUTPATIENT
Start: 2023-05-31

## 2023-05-31 NOTE — TELEPHONE ENCOUNTER
Pt asking for macrobid script to prevent UTI - she's traveling soon and has been prescribed this in the past for travel w/o appt.   Pls advise

## 2023-08-01 ENCOUNTER — OFFICE VISIT (OUTPATIENT)
Facility: CLINIC | Age: 58
End: 2023-08-01
Payer: COMMERCIAL

## 2023-08-01 ENCOUNTER — TELEPHONE (OUTPATIENT)
Facility: CLINIC | Age: 58
End: 2023-08-01

## 2023-08-01 VITALS
SYSTOLIC BLOOD PRESSURE: 124 MMHG | BODY MASS INDEX: 19.1 KG/M2 | HEART RATE: 82 BPM | HEIGHT: 68 IN | WEIGHT: 126 LBS | DIASTOLIC BLOOD PRESSURE: 72 MMHG

## 2023-08-01 DIAGNOSIS — Z12.4 PAPANICOLAOU SMEAR FOR CERVICAL CANCER SCREENING: ICD-10-CM

## 2023-08-01 DIAGNOSIS — Z01.419 WELL WOMAN EXAM WITH ROUTINE GYNECOLOGICAL EXAM: Primary | ICD-10-CM

## 2023-08-01 DIAGNOSIS — Z12.31 ENCOUNTER FOR SCREENING MAMMOGRAM FOR BREAST CANCER: ICD-10-CM

## 2023-08-01 PROCEDURE — 99396 PREV VISIT EST AGE 40-64: CPT | Performed by: OBSTETRICS & GYNECOLOGY

## 2023-08-01 PROCEDURE — 3078F DIAST BP <80 MM HG: CPT | Performed by: OBSTETRICS & GYNECOLOGY

## 2023-08-01 PROCEDURE — 3074F SYST BP LT 130 MM HG: CPT | Performed by: OBSTETRICS & GYNECOLOGY

## 2023-08-01 PROCEDURE — 3008F BODY MASS INDEX DOCD: CPT | Performed by: OBSTETRICS & GYNECOLOGY

## 2023-08-01 RX ORDER — NITROFURANTOIN 25; 75 MG/1; MG/1
100 CAPSULE ORAL AS NEEDED
Qty: 30 CAPSULE | Refills: 3 | Status: SHIPPED | OUTPATIENT
Start: 2023-08-01

## 2023-08-01 RX ORDER — ESTRADIOL 0.1 MG/G
1 CREAM VAGINAL
Qty: 1 EACH | Refills: 5 | Status: SHIPPED | OUTPATIENT
Start: 2023-08-03

## 2023-08-01 NOTE — PROGRESS NOTES
Cherelle Ballesteros is a 59year old female  No LMP recorded. (Menstrual status: Menopause). Patient presents with:  Physical  .     Since she takes the Macrodantin after sex she has not had a bladder infection for 2 years she is also taking cranberry pills. Complains of dyspareunia. Vaginal estrogen was discussed and she will go ahead. She takes vitamin D. Blood work will be ordered. No colon cancer in her family she wishes to proceed with a Cologuard    OBSTETRICS HISTORY:  OB History    Para Term  AB Living   3 2 2   1 2   SAB IAB Ectopic Multiple Live Births   1              # Outcome Date GA Lbr Roni/2nd Weight Sex Delivery Anes PTL Lv   3 SAB            2 Term 96 40w0d   F NORMAL SPONT      1 Term 90 40w0d   M NORMAL SPONT          GYNE HISTORY:  Periods none due to menopause      Sexual activity:   Yes      Partners:   Male      Birth control/ protection:   Vasectomy      Comment:              Hx Prior Abnormal Pap: No  Pap Date: 22  Pap Result Notes: wnl        MEDICAL HISTORY:  Past Medical History:   Diagnosis Date    Achilles bursitis or tendinitis     Acne     Acute cystitis     Acute sinusitis, unspecified     Acute upper respiratory infections of unspecified site     Bronchitis, not specified as acute or chronic     Dystonia         H/O mammogram ,10/15,,11/10,10/09,    benign    H/O mammogram ,10/15,     negative    H/O mammogram 2022    A 1 year screening mammogram is recommended.  Given the patients breast density, screening automated breast autrasound should be considered as a supplement to the patients annual mammogram.    History of endometrial biopsy 2011    no hyperplasia and/or malignancy noted    Mitral valve disorders(424.0)     Oral aphthae     Pap smear for cervical cancer screening ,,,10/9,,,,,    negative    Personal history of urinary (tract) infection Pleurisy without mention of effusion or current tuberculosis        SURGICAL HISTORY:  Past Surgical History:   Procedure Laterality Date    Other surgical history      urethra surgery, as a child       SOCIAL HISTORY:  Social History    Socioeconomic History      Marital status:       Spouse name: Not on file      Number of children: Not on file      Years of education: Not on file      Highest education level: Not on file    Occupational History      Not on file    Tobacco Use      Smoking status: Never      Smokeless tobacco: Never    Vaping Use      Vaping Use: Never used    Substance and Sexual Activity      Alcohol use:  Yes        Alcohol/week: 7.0 standard drinks of alcohol        Types: 7 Standard drinks or equivalent per week      Drug use: No      Sexual activity: Yes        Partners: Male        Birth control/protection: Vasectomy        Comment: 2003    Other Topics      Concerns:         Service: Not Asked        Blood Transfusions: Not Asked        Caffeine Concern: No        Occupational Exposure: Not Asked        Hobby Hazards: Not Asked        Sleep Concern: Not Asked        Stress Concern: Not Asked        Weight Concern: Not Asked        Special Diet: Not Asked        Back Care: Not Asked        Exercise: Yes          3 x week        Bike Helmet: Not Asked        Seat Belt: Yes        Self-Exams: Not Asked    Social History Narrative      Not on file    Social Determinants of Health  Financial Resource Strain: Not on file  Food Insecurity: Not on file  Transportation Needs: Not on file  Physical Activity: Not on file  Stress: Not on file  Social Connections: Not on file  Housing Stability: Not on file    FAMILY HISTORY:  Family History   Problem Relation Age of Onset    Hypertension Father     Other (rheumatoid arthritis) Father     Thyroid Disorder Mother     Dementia Mother         passed one month ago    No Known Problems Maternal Grandmother     No Known Problems Maternal Grandfather     No Known Problems Paternal Grandmother     No Known Problems Paternal Grandfather     Cancer Maternal Cousin Female 54        bone    No Known Problems Daughter     No Known Problems Son     Skin cancer Maternal Uncle [de-identified]       MEDICATIONS:    Current Outpatient Medications:     clindamycin 1 % External Solution, Apply 1 Application. topically 2 (two) times daily. , Disp: 60 mL, Rfl: 5    triamcinolone 0.1 % External Cream, Apply to skin BID prn rash. Do not use for more than 10 consecutive days. , Disp: 80 g, Rfl: 5    tretinoin 0.025 % External Cream, Apply 1 Application topically 2 (two) times a day., Disp: 20 g, Rfl: 5    clobetasol 0.05 % External Cream, Apply 1 Application topically 2 (two) times daily. Apply to body twice daily, Disp: 60 g, Rfl: 5    clobetasol 0.05 % External Solution, Apply 1 mL topically 2 (two) times daily. Apply to scalp twice daily, Disp: 50 mL, Rfl: 5    clindamycin 1 % External Solution, Apply 1 Application topically 2 (two) times daily. , Disp: 60 mL, Rfl: 5    Diclofenac Sodium 1.5 % External Solution, Apply 1 Application topically 3 (three) times daily as needed. , Disp: 150 mL, Rfl: 0    nitrofurantoin monohydrate macro 100 MG Oral Cap, Take 1 capsule (100 mg total) by mouth 2 (two) times daily. , Disp: 14 capsule, Rfl: 0    nitrofurantoin monohydrate macro 100 MG Oral Cap, Take 1 capsule (100 mg total) by mouth 2 (two) times daily. , Disp: 10 capsule, Rfl: 0    FLUTICASONE PROPIONATE 50 MCG/ACT Nasal Suspension, SHAKE LIQUID AND USE 2 SPRAYS IN EACH NOSTRIL DAILY (Patient not taking: Reported on 8/1/2023), Disp: 48 g, Rfl: 0    CRANBERRY CONCENTRATE OR, Take by mouth., Disp: , Rfl:     ALLERGIES:    Tetracycline Hcl        SWELLING    Comment:Facial swelling      Review of Systems:  Constitutional:  Denies fatigue, night sweats, hot flashes  Gastrointestinal:  denies heartburn, abdominal pain, diarrhea or constipation  Genitourinary:  denies dysuria, incontinence, abnormal vaginal discharge, vaginal itching  Skin/Breast:  Denies any breast pain, lumps, or discharge. Neurological:  denies headaches, extremity weakness or numbness. PHYSICAL EXAM:   Constitutional: well developed, well nourished  Head/Face: normocephalic  Neck/Thyroid: thyroid symmetric, no thyromegaly, no nodules, no adenopathy  Breast: normal without palpable masses, tenderness, asymmetry, nipple discharge, nipple retraction or skin changes  Abdomen:  soft, nontender, nondistended, no masses  Skin/Hair: no unusual rashes or bruises   Extremities: no edema, no cyanosis  Psychiatric:  Oriented to time, place, person and situation. Appropriate mood and affect    Pelvic Exam:  External Genitalia: normal appearance, hair distribution, and no lesions  Urethral Meatus:  normal in size, location, without lesions and prolapse  Bladder:  No fullness, masses or tenderness  Vagina:  Normal appearance without lesions, no abnormal discharge  Cervix:  Normal without tenderness on motion without lesions Pap. Uterus: normal in size, contour, position, mobility, without tenderness  Adnexa: normal without masses or tenderness  Perineum: normal  Anus: no hemorroids     Assessment & Plan:  Patricia Ayala was seen today for physical.    Diagnoses and all orders for this visit:    Well woman exam with routine gynecological exam    Encounter for screening mammogram for breast cancer  -     [facility] KAUR 2D+3D SCREENING BILAT (CPT=77067/30064); Future        Blood work. Vaginal estrogen. Macrodantin after intercourse.

## 2023-12-22 ENCOUNTER — OFFICE VISIT (OUTPATIENT)
Dept: FAMILY MEDICINE CLINIC | Facility: CLINIC | Age: 58
End: 2023-12-22
Payer: COMMERCIAL

## 2023-12-22 VITALS — OXYGEN SATURATION: 98 % | TEMPERATURE: 98 F | RESPIRATION RATE: 20 BRPM | HEART RATE: 87 BPM

## 2023-12-22 DIAGNOSIS — J06.9 VIRAL URI: ICD-10-CM

## 2023-12-22 DIAGNOSIS — J02.9 SORE THROAT: Primary | ICD-10-CM

## 2023-12-22 LAB
CONTROL LINE PRESENT WITH A CLEAR BACKGROUND (YES/NO): YES YES/NO
KIT LOT #: NORMAL NUMERIC
STREP GRP A CUL-SCR: NEGATIVE

## 2023-12-22 PROCEDURE — 99213 OFFICE O/P EST LOW 20 MIN: CPT | Performed by: NURSE PRACTITIONER

## 2023-12-22 PROCEDURE — 87880 STREP A ASSAY W/OPTIC: CPT | Performed by: NURSE PRACTITIONER

## 2024-03-26 ENCOUNTER — TELEPHONE (OUTPATIENT)
Facility: CLINIC | Age: 59
End: 2024-03-26

## 2024-08-13 ENCOUNTER — OFFICE VISIT (OUTPATIENT)
Facility: CLINIC | Age: 59
End: 2024-08-13
Payer: COMMERCIAL

## 2024-08-13 VITALS
HEART RATE: 65 BPM | DIASTOLIC BLOOD PRESSURE: 68 MMHG | BODY MASS INDEX: 18.79 KG/M2 | WEIGHT: 124 LBS | HEIGHT: 68 IN | SYSTOLIC BLOOD PRESSURE: 118 MMHG

## 2024-08-13 DIAGNOSIS — Z12.31 ENCOUNTER FOR SCREENING MAMMOGRAM FOR BREAST CANCER: ICD-10-CM

## 2024-08-13 DIAGNOSIS — Z01.419 WELL WOMAN EXAM WITH ROUTINE GYNECOLOGICAL EXAM: Primary | ICD-10-CM

## 2024-08-13 PROCEDURE — 3078F DIAST BP <80 MM HG: CPT | Performed by: OBSTETRICS & GYNECOLOGY

## 2024-08-13 PROCEDURE — 99396 PREV VISIT EST AGE 40-64: CPT | Performed by: OBSTETRICS & GYNECOLOGY

## 2024-08-13 PROCEDURE — 3074F SYST BP LT 130 MM HG: CPT | Performed by: OBSTETRICS & GYNECOLOGY

## 2024-08-13 PROCEDURE — 3008F BODY MASS INDEX DOCD: CPT | Performed by: OBSTETRICS & GYNECOLOGY

## 2024-08-13 NOTE — PROGRESS NOTES
Ela Lobo is a 58 year old female  No LMP recorded. (Menstrual status: Menopause).   Chief Complaint   Patient presents with    Wellness Visit     -No complaints    .   She does not have any vaginal bleeding.  Has been unable to do a colon screening secondary to travel.  No colon cancer in her family.  She wishes to do a Cologuard.  She takes vitamin D.  She does have some vaginal dryness no bleeding after intercourse does not desire hormones at this time.  Hyaluronic acid suppositories and Astroglide were discussed.  Blood work will be ordered.      OBSTETRICS HISTORY:  OB History    Para Term  AB Living   3 2 2   1 2   SAB IAB Ectopic Multiple Live Births   1              # Outcome Date GA Lbr Roni/2nd Weight Sex Type Anes PTL Lv   3 SAB            2 Term 96 40w0d   F NORMAL SPONT      1 Term 90 40w0d   M NORMAL SPONT          GYNE HISTORY:  Periods none due to menopause    History   Sexual Activity    Sexual activity: Yes    Partners: Male    Birth control/ protection: Vasectomy     Comment:         Pap Date: 23  Pap Result Notes: Negative        MEDICAL HISTORY:  Past Medical History:    Achilles bursitis or tendinitis    Acne    Acute cystitis    Acute sinusitis, unspecified    Acute upper respiratory infections of unspecified site    Bronchitis, not specified as acute or chronic    Dystonia        H/O mammogram    benign    H/O mammogram    negative    H/O mammogram    A 1 year screening mammogram is recommended. Given the patients breast density, screening automated breast autrasound should be considered as a supplement to the patients annual mammogram.    H/O mammogram    There is no evidence of mammographic malignancy. A 1 year screening mammogram is recommended.    History of endometrial biopsy    no hyperplasia and/or malignancy noted    Mitral valve disorders(424.0)    Oral aphthae    Pap smear for cervical cancer screening    negative    Personal  history of urinary (tract) infection    Pleurisy without mention of effusion or current tuberculosis       SURGICAL HISTORY:  Past Surgical History:   Procedure Laterality Date    Other surgical history      urethra surgery, as a child       SOCIAL HISTORY:  Social History     Socioeconomic History    Marital status:      Spouse name: Not on file    Number of children: Not on file    Years of education: Not on file    Highest education level: Not on file   Occupational History    Not on file   Tobacco Use    Smoking status: Never    Smokeless tobacco: Never   Vaping Use    Vaping status: Never Used   Substance and Sexual Activity    Alcohol use: Yes     Alcohol/week: 7.0 standard drinks of alcohol     Types: 7 Standard drinks or equivalent per week    Drug use: No    Sexual activity: Yes     Partners: Male     Birth control/protection: Vasectomy     Comment: 2003   Other Topics Concern     Service Not Asked    Blood Transfusions Not Asked    Caffeine Concern No    Occupational Exposure Not Asked    Hobby Hazards Not Asked    Sleep Concern Not Asked    Stress Concern Not Asked    Weight Concern Not Asked    Special Diet Not Asked    Back Care Not Asked    Exercise Yes     Comment: 3 x week    Bike Helmet Not Asked    Seat Belt Yes    Self-Exams Not Asked   Social History Narrative    Not on file     Social Determinants of Health     Financial Resource Strain: Not on file   Food Insecurity: Not on file   Transportation Needs: Not on file   Physical Activity: Not on file   Stress: Not on file   Social Connections: Unknown (3/14/2021)    [facility]    Social Connections     Conversations with friends/family/neighbors per week: Not on file   Housing Stability: Low Risk  (7/9/2021)    [facility], [facility]    Housing Stability     Mortgage Payment Concerns?: Not on file     Number of  Places Lived in the Last Year: Not on file     Unstable Housing?: Not on file       FAMILY HISTORY:  Family History   Problem Relation Age of Onset    Hypertension Father     Other (rheumatoid arthritis) Father     Thyroid Disorder Mother     Dementia Mother         passed one month ago    No Known Problems Daughter     No Known Problems Son     No Known Problems Maternal Grandmother     No Known Problems Maternal Grandfather     No Known Problems Paternal Grandmother     No Known Problems Paternal Grandfather     Skin cancer Maternal Uncle 80    Cancer Maternal Cousin Female 55        bone    Prostate Cancer Neg     Ovarian Cancer Neg     Uterine Cancer Neg     Pancreatic Cancer Neg     Colon Cancer Neg     Breast Cancer Neg        MEDICATIONS:    Current Outpatient Medications:     Ergocalciferol (VITAMIN D OR), Take by mouth., Disp: , Rfl:     amoxicillin 250 MG Oral Cap, Take 1 capsule (250 mg total) by mouth daily., Disp: 30 capsule, Rfl: 5    triamcinolone 0.1 % External Cream, Apply to skin BID prn rash. Do not use for more than 10 consecutive days., Disp: 80 g, Rfl: 5    tretinoin 0.025 % External Cream, Apply 1 Application topically 2 (two) times a day., Disp: 20 g, Rfl: 5    CRANBERRY CONCENTRATE OR, Take by mouth., Disp: , Rfl:     nitrofurantoin monohydrate macro 100 MG Oral Cap, Take 1 capsule (100 mg total) by mouth as needed. (Patient not taking: Reported on 8/13/2024), Disp: 30 capsule, Rfl: 3    nitrofurantoin monohydrate macro 100 MG Oral Cap, Take 1 capsule (100 mg total) by mouth 2 (two) times daily. (Patient not taking: Reported on 8/13/2024), Disp: 10 capsule, Rfl: 0    FLUTICASONE PROPIONATE 50 MCG/ACT Nasal Suspension, SHAKE LIQUID AND USE 2 SPRAYS IN EACH NOSTRIL DAILY (Patient not taking: Reported on 8/1/2023), Disp: 48 g, Rfl: 0    ALLERGIES:    Allergies   Allergen Reactions    Tetracycline Hcl SWELLING     Facial swelling         Review of Systems:  Constitutional:  Denies fatigue, night  sweats, hot flashes  Gastrointestinal:  denies heartburn, abdominal pain, diarrhea or constipation  Genitourinary:  denies dysuria, incontinence, abnormal vaginal discharge, vaginal itching  Skin/Breast:  Denies any breast pain, lumps, or discharge.   Neurological:  denies headaches, extremity weakness or numbness.      PHYSICAL EXAM:   Constitutional: well developed, well nourished  Head/Face: normocephalic  Neck/Thyroid: thyroid symmetric, no thyromegaly, no nodules, no adenopathy  Breast: normal without palpable masses, tenderness, asymmetry, nipple discharge, nipple retraction or skin changes  Abdomen:  soft, nontender, nondistended, no masses  Skin/Hair: no unusual rashes or bruises   Extremities: no edema, no cyanosis  Psychiatric:  Oriented to time, place, person and situation. Appropriate mood and affect    Pelvic Exam:  External Genitalia: normal appearance, hair distribution, and no lesions  Urethral Meatus:  normal in size, location, without lesions and prolapse  Bladder:  No fullness, masses or tenderness  Vagina:  Normal appearance without lesions, no abnormal discharge  Cervix:  Normal without tenderness on motion without lesions.  Uterus: normal in size, contour, position, mobility, without tenderness  Adnexa: normal without masses or tenderness  Perineum: normal  Anus: no hemorroids     Assessment & Plan:  Ela was seen today for wellness visit.    Diagnoses and all orders for this visit:    Well woman exam with routine gynecological exam    Encounter for screening mammogram for breast cancer  -     [facility] KAUR 2D+3D SCREENING BILAT (CPT=77067/62178); Future

## 2024-09-19 ENCOUNTER — TELEPHONE (OUTPATIENT)
Facility: CLINIC | Age: 59
End: 2024-09-19

## 2025-01-13 ENCOUNTER — TELEPHONE (OUTPATIENT)
Facility: CLINIC | Age: 60
End: 2025-01-13

## 2025-01-16 ENCOUNTER — OFFICE VISIT (OUTPATIENT)
Dept: FAMILY MEDICINE CLINIC | Facility: CLINIC | Age: 60
End: 2025-01-16
Payer: COMMERCIAL

## 2025-01-16 VITALS
SYSTOLIC BLOOD PRESSURE: 130 MMHG | OXYGEN SATURATION: 98 % | BODY MASS INDEX: 18.19 KG/M2 | TEMPERATURE: 98 F | HEIGHT: 68 IN | DIASTOLIC BLOOD PRESSURE: 85 MMHG | WEIGHT: 120 LBS | RESPIRATION RATE: 18 BRPM | HEART RATE: 79 BPM

## 2025-01-16 DIAGNOSIS — R39.9 URINARY SYMPTOM OR SIGN: Primary | ICD-10-CM

## 2025-01-16 LAB
APPEARANCE: CLEAR
BILIRUBIN: NEGATIVE
GLUCOSE (URINE DIPSTICK): NEGATIVE MG/DL
KETONES (URINE DIPSTICK): NEGATIVE MG/DL
LEUKOCYTES: NEGATIVE
MULTISTIX LOT#: NORMAL NUMERIC
NITRITE, URINE: NEGATIVE
OCCULT BLOOD: NEGATIVE
PH, URINE: 7 (ref 4.5–8)
PROTEIN (URINE DIPSTICK): NEGATIVE MG/DL
SPECIFIC GRAVITY: 1.01 (ref 1–1.03)
URINE-COLOR: YELLOW
UROBILINOGEN,SEMI-QN: 0.2 MG/DL (ref 0–1.9)

## 2025-01-16 PROCEDURE — 3075F SYST BP GE 130 - 139MM HG: CPT | Performed by: NURSE PRACTITIONER

## 2025-01-16 PROCEDURE — 3008F BODY MASS INDEX DOCD: CPT | Performed by: NURSE PRACTITIONER

## 2025-01-16 PROCEDURE — 99213 OFFICE O/P EST LOW 20 MIN: CPT | Performed by: NURSE PRACTITIONER

## 2025-01-16 PROCEDURE — 87086 URINE CULTURE/COLONY COUNT: CPT | Performed by: NURSE PRACTITIONER

## 2025-01-16 PROCEDURE — 3079F DIAST BP 80-89 MM HG: CPT | Performed by: NURSE PRACTITIONER

## 2025-01-16 PROCEDURE — 81003 URINALYSIS AUTO W/O SCOPE: CPT | Performed by: NURSE PRACTITIONER

## 2025-01-16 NOTE — PROGRESS NOTES
CHIEF COMPLAINT:     Chief Complaint   Patient presents with    Urinary Symptoms      Urgency and discomfort . For 3 days   OTC: none        HPI:   Ela Lobo is a 59 year old female who presents with symptoms of UTI. Complaining of urinary urgency and discomfort x 3 days. Symptoms have been same since onset.  Treatments tried: none.  Associated symptoms: none.   Denies flank pain, fever, hematuria, nausea, or vomiting.  Denies vaginal discharge or itching  Other  hx: no  Hx of pyelonephritis:no  Hx of kidney stone: no    Current Outpatient Medications   Medication Sig Dispense Refill    lidocaine-prilocaine 2.5-2.5 % External Cream Apply to affected areas 1 hr prior to office visit 30 g 0    Ergocalciferol (VITAMIN D OR) Take by mouth.      amoxicillin 250 MG Oral Cap Take 1 capsule (250 mg total) by mouth daily. 30 capsule 5    nitrofurantoin monohydrate macro 100 MG Oral Cap Take 1 capsule (100 mg total) by mouth as needed. (Patient not taking: Reported on 8/13/2024) 30 capsule 3    triamcinolone 0.1 % External Cream Apply to skin BID prn rash. Do not use for more than 10 consecutive days. 80 g 5    nitrofurantoin monohydrate macro 100 MG Oral Cap Take 1 capsule (100 mg total) by mouth 2 (two) times daily. (Patient not taking: Reported on 8/13/2024) 10 capsule 0    tretinoin 0.025 % External Cream Apply 1 Application topically 2 (two) times a day. 20 g 5    FLUTICASONE PROPIONATE 50 MCG/ACT Nasal Suspension SHAKE LIQUID AND USE 2 SPRAYS IN EACH NOSTRIL DAILY (Patient not taking: Reported on 8/1/2023) 48 g 0    CRANBERRY CONCENTRATE OR Take by mouth.        Past Medical History:    Achilles bursitis or tendinitis    Acne    Acute cystitis    Acute sinusitis, unspecified    Acute upper respiratory infections of unspecified site    Bronchitis, not specified as acute or chronic    Dystonia    2016    H/O mammogram    benign    H/O mammogram    negative    H/O mammogram    A 1 year screening mammogram is  recommended. Given the patients breast density, screening automated breast autrasound should be considered as a supplement to the patients annual mammogram.    H/O mammogram    There is no evidence of mammographic malignancy. A 1 year screening mammogram is recommended.    History of endometrial biopsy    no hyperplasia and/or malignancy noted    Mitral valve disorders(424.0)    Oral aphthae    Pap smear for cervical cancer screening    negative    Personal history of urinary (tract) infection    Pleurisy without mention of effusion or current tuberculosis      Social History:  Social History     Socioeconomic History    Marital status:    Tobacco Use    Smoking status: Never    Smokeless tobacco: Never   Vaping Use    Vaping status: Never Used   Substance and Sexual Activity    Alcohol use: Yes     Alcohol/week: 7.0 standard drinks of alcohol     Types: 7 Standard drinks or equivalent per week    Drug use: No    Sexual activity: Yes     Partners: Male     Birth control/protection: Vasectomy     Comment: 2003   Other Topics Concern    Caffeine Concern No    Exercise Yes     Comment: 3 x week    Seat Belt Yes     Social Drivers of Health      [facility], [facility]    Social Connections    Received from [facility], [facility]    Housing Stability         REVIEW OF SYSTEMS:   GENERAL: Denies fever, chills, or body aches; good appetite  SKIN: no rashes, no skin wounds or ulcers.  EYES:denies blurred vision or double vision  HEENT: no congestion, rhinorrhea, sore throat or ear pain  CARDIOVASCULAR: denies chest pain or palpitations  LUNGS: denies shortness of breath, cough, or wheezing  GI: See HPI. No N/V/C/D.   : See HPI.  NEURO: no headaches.    EXAM:   /85   Pulse 79   Temp 98.2 °F (36.8 °C) (Oral)   Resp 18   Ht 5' 8\" (1.727 m)   Wt 120 lb (54.4 kg)   SpO2 98%   BMI 18.25 kg/m²   GENERAL: well  developed, well nourished,in no apparent distress  CARDIO: RRR, no murmurs  LUNGS: clear to ausculation bilaterally, no wheezing or rhonchi  GI: BS present x 4.  No hepatosplenomegaly, no tenderness  : no suprapubic tenderness, bladder distention, or CVAT   EXTREMITIES: no edema    Recent Results (from the past 24 hours)   URINALYSIS, AUTO, W/O SCOPE    Collection Time: 01/16/25  4:34 PM   Result Value Ref Range    Glucose Urine Negative Negative mg/dL    Bilirubin Urine Negative Negative    Ketones, UA Negative Negative - Trace mg/dL    Spec Gravity 1.010 1.005 - 1.030    Blood Urine Negative Negative    PH Urine 7.0 5.0 - 8.0    Protein Urine Negative Negative - Trace mg/dL    Urobilinogen Urine 0.2 0.2 - 1.0 mg/dL    Nitrite Urine Negative Negative    Leukocyte Esterase Urine Negative Negative    APPEARANCE clear Clear    Color Urine yellow Yellow    Mult[employer]ix Lot# [id number] Numeric    Multistix Expiration Date 9/30/25 Date         ASSESSMENT AND PLAN:   Ela Lobo is a 59 year old female presents with UTI symptoms.    ASSESSMENT:  Encounter Diagnosis   Name Primary?    Urinary symptom or sign Yes     1. Urinary symptom or sign  - Urine Culture, Routine  - URINALYSIS, AUTO, W/O SCOPE    Send culture, will treat if positive culture.     PLAN: Meds as listed below.  Urine sent for culture. Comfort measures as described in Patient Instructions    Meds & Refills for this Visit:  Requested Prescriptions      No prescriptions requested or ordered in this encounter       Risk and benefits of medication discussed.     The patient indicates understanding of these issues and agrees to the plan.  The patient is asked to follow up with PCP in 3-5 days if not better. ER if fever, vomiting, worsening symptoms.

## 2025-02-03 ENCOUNTER — TELEPHONE (OUTPATIENT)
Dept: FAMILY MEDICINE CLINIC | Facility: CLINIC | Age: 60
End: 2025-02-03

## 2025-02-03 DIAGNOSIS — U07.1 COVID-19: ICD-10-CM

## 2025-02-03 RX ORDER — FLUTICASONE PROPIONATE 50 MCG
2 SPRAY, SUSPENSION (ML) NASAL DAILY
Qty: 48 G | Refills: 0 | OUTPATIENT
Start: 2025-02-03

## 2025-02-04 ENCOUNTER — OFFICE VISIT (OUTPATIENT)
Dept: FAMILY MEDICINE CLINIC | Facility: CLINIC | Age: 60
End: 2025-02-04
Payer: COMMERCIAL

## 2025-02-04 VITALS
SYSTOLIC BLOOD PRESSURE: 100 MMHG | OXYGEN SATURATION: 98 % | BODY MASS INDEX: 19.25 KG/M2 | RESPIRATION RATE: 16 BRPM | TEMPERATURE: 97 F | HEART RATE: 96 BPM | DIASTOLIC BLOOD PRESSURE: 64 MMHG | HEIGHT: 68 IN | WEIGHT: 127 LBS

## 2025-02-04 DIAGNOSIS — R39.9 URINARY SYMPTOM OR SIGN: Primary | ICD-10-CM

## 2025-02-04 PROCEDURE — 87086 URINE CULTURE/COLONY COUNT: CPT

## 2025-02-04 PROCEDURE — 99213 OFFICE O/P EST LOW 20 MIN: CPT

## 2025-02-04 PROCEDURE — 3078F DIAST BP <80 MM HG: CPT

## 2025-02-04 PROCEDURE — 87186 SC STD MICRODIL/AGAR DIL: CPT

## 2025-02-04 PROCEDURE — 3074F SYST BP LT 130 MM HG: CPT

## 2025-02-04 PROCEDURE — 87077 CULTURE AEROBIC IDENTIFY: CPT

## 2025-02-04 PROCEDURE — 3008F BODY MASS INDEX DOCD: CPT

## 2025-02-04 RX ORDER — FLUTICASONE PROPIONATE 50 MCG
2 SPRAY, SUSPENSION (ML) NASAL DAILY
Qty: 48 G | Refills: 0 | Status: CANCELLED | OUTPATIENT
Start: 2025-02-04

## 2025-02-04 RX ORDER — NITROFURANTOIN 25; 75 MG/1; MG/1
100 CAPSULE ORAL 2 TIMES DAILY
Qty: 14 CAPSULE | Refills: 0 | Status: SHIPPED | OUTPATIENT
Start: 2025-02-04 | End: 2025-02-11

## 2025-02-12 ENCOUNTER — OFFICE VISIT (OUTPATIENT)
Dept: FAMILY MEDICINE CLINIC | Facility: CLINIC | Age: 60
End: 2025-02-12
Payer: COMMERCIAL

## 2025-02-12 ENCOUNTER — TELEPHONE (OUTPATIENT)
Dept: FAMILY MEDICINE CLINIC | Facility: CLINIC | Age: 60
End: 2025-02-12

## 2025-02-12 VITALS
HEIGHT: 68 IN | TEMPERATURE: 99 F | BODY MASS INDEX: 18.94 KG/M2 | HEART RATE: 78 BPM | WEIGHT: 125 LBS | OXYGEN SATURATION: 98 % | DIASTOLIC BLOOD PRESSURE: 69 MMHG | SYSTOLIC BLOOD PRESSURE: 111 MMHG | RESPIRATION RATE: 16 BRPM

## 2025-02-12 DIAGNOSIS — R39.9 UTI SYMPTOMS: Primary | ICD-10-CM

## 2025-02-12 LAB
APPEARANCE: CLEAR
BILIRUBIN: NEGATIVE
GLUCOSE (URINE DIPSTICK): NEGATIVE MG/DL
KETONES (URINE DIPSTICK): NEGATIVE MG/DL
LEUKOCYTES: NEGATIVE
MULTISTIX LOT#: NORMAL NUMERIC
NITRITE, URINE: NEGATIVE
OCCULT BLOOD: NEGATIVE
PH, URINE: 5.5 (ref 4.5–8)
PROTEIN (URINE DIPSTICK): NEGATIVE MG/DL
SPECIFIC GRAVITY: <=1.005 (ref 1–1.03)
URINE-COLOR: YELLOW
UROBILINOGEN,SEMI-QN: 0.2 MG/DL (ref 0–1.9)

## 2025-02-12 PROCEDURE — 3074F SYST BP LT 130 MM HG: CPT | Performed by: NURSE PRACTITIONER

## 2025-02-12 PROCEDURE — 3008F BODY MASS INDEX DOCD: CPT | Performed by: NURSE PRACTITIONER

## 2025-02-12 PROCEDURE — 3078F DIAST BP <80 MM HG: CPT | Performed by: NURSE PRACTITIONER

## 2025-02-12 PROCEDURE — 81003 URINALYSIS AUTO W/O SCOPE: CPT | Performed by: NURSE PRACTITIONER

## 2025-02-12 PROCEDURE — 99213 OFFICE O/P EST LOW 20 MIN: CPT | Performed by: NURSE PRACTITIONER

## 2025-02-12 PROCEDURE — 87086 URINE CULTURE/COLONY COUNT: CPT | Performed by: NURSE PRACTITIONER

## 2025-02-12 NOTE — PROGRESS NOTES
CHIEF COMPLAINT:     Chief Complaint   Patient presents with    UTI     Finished antibiotic yesterday, sx not fully resolved still having soreness, frequency and urgency, no otc       HPI:   Ela Lobo is a 59 year old female who presents with symptoms of UTI. Complaining of urinary frequency, urgency, dysuria for last 1 days.   Just completed abx for UTI yesterday.  Reports symptoms were improved while taking, but seems to be coming back a bit now.   Treatments tried: cranberry pills.    Denies flank pain, fever, hematuria, nausea, or vomiting.  Denies vaginal discharge or itching.   Current Outpatient Medications   Medication Sig Dispense Refill    lidocaine-prilocaine 2.5-2.5 % External Cream Apply to affected areas 1 hr prior to office visit 30 g 0    Ergocalciferol (VITAMIN D OR) Take by mouth.      amoxicillin 250 MG Oral Cap Take 1 capsule (250 mg total) by mouth daily. 30 capsule 5    triamcinolone 0.1 % External Cream Apply to skin BID prn rash. Do not use for more than 10 consecutive days. 80 g 5    tretinoin 0.025 % External Cream Apply 1 Application topically 2 (two) times a day. 20 g 5    FLUTICASONE PROPIONATE 50 MCG/ACT Nasal Suspension SHAKE LIQUID AND USE 2 SPRAYS IN EACH NOSTRIL DAILY (Patient not taking: Reported on 8/1/2023) 48 g 0    CRANBERRY CONCENTRATE OR Take by mouth.        Past Medical History:    Achilles bursitis or tendinitis    Acne    Acute cystitis    Acute sinusitis, unspecified    Acute upper respiratory infections of unspecified site    Bronchitis, not specified as acute or chronic    Dystonia    2016    H/O mammogram    benign    H/O mammogram    negative    H/O mammogram    A 1 year screening mammogram is recommended. Given the patients breast density, screening automated breast autrasound should be considered as a supplement to the patients annual mammogram.    H/O mammogram    There is no evidence of mammographic malignancy. A 1 year screening mammogram is recommended.     History of endometrial biopsy    no hyperplasia and/or malignancy noted    Mitral valve disorders(424.0)    Oral aphthae    Pap smear for cervical cancer screening    negative    Personal history of urinary (tract) infection    Pleurisy without mention of effusion or current tuberculosis      Social History:  Social History     Socioeconomic History    Marital status:    Tobacco Use    Smoking status: Never    Smokeless tobacco: Never   Vaping Use    Vaping status: Never Used   Substance and Sexual Activity    Alcohol use: Yes     Alcohol/week: 7.0 standard drinks of alcohol     Types: 7 Standard drinks or equivalent per week    Drug use: No    Sexual activity: Yes     Partners: Male     Birth control/protection: Vasectomy     Comment: 2003   Other Topics Concern    Caffeine Concern No    Exercise Yes     Comment: 3 x week    Seat Belt Yes     Social Drivers of Health      [facility]    Housing Stability         REVIEW OF SYSTEMS:   GENERAL: Denies fever, chills, or body aches  SKIN: no rashes, no skin wounds or ulcers.  EYES:denies blurred vision or double vision  HEENT: no congestion, rhinorrhea, sore throat or ear pain  CARDIOVASCULAR: denies chest pain or palpitations  LUNGS: denies shortness of breath, cough, or wheezing  GI: See HPI. No N/V/C/D.   : See HPI.  NEURO: no headaches.    EXAM:   /69   Pulse 78   Temp 98.5 °F (36.9 °C)   Resp 16   Ht 5' 8\" (1.727 m)   Wt 125 lb (56.7 kg)   SpO2 98%   BMI 19.01 kg/m²   GENERAL: well developed, well nourished,in no apparent distress  CARDIO: RRR, no murmurs  LUNGS: clear to ausculation bilaterally, no wheezing or rhonchi  GI: BS present x 4.  No hepatosplenomegaly.  : No suprapubic tenderness, bladder distention, or CVAT     Recent Results (from the past 24 hours)   URINALYSIS, AUTO, W/O SCOPE    Collection Time: 02/12/25  1:35 PM   Result Value Ref Range    Glucose Urine  Negative Negative mg/dL    Bilirubin Urine Negative Negative    Ketones, UA Negative Negative - Trace mg/dL    Spec Gravity <=1.005 1.005 - 1.030    Blood Urine Negative Negative    PH Urine 5.5 5.0 - 8.0    Protein Urine Negative Negative - Trace mg/dL    Urobilinogen Urine 0.2 0.2 - 1.0 mg/dL    Nitrite Urine Negative Negative    Leukocyte Esterase Urine Negative Negative    APPEARANCE clear Clear    Color Urine yellow Yellow    Multistix Lot# [id number] Numeric    Multistix Expiration Date 6/30/25 Date         ASSESSMENT AND PLAN:   Ela Lobo is a 59 year old female presents with UTI symptoms.    ASSESSMENT:  Encounter Diagnosis   Name Primary?    UTI symptoms Yes       PLAN: Will send culture given recent hx.  Will wait for culture to come back prior to treatment.   Pt. Would prefer phone call and not MyChart to discuss results.   Comfort measures as described in Patient Instructions. Patient/Parent has given us consent to send out a culture and understand that they will be contacted in 2-3 days with culture results. If any severe back pain, inability to urinate, fever >101 or persistent vomiting seek emergent care.         Meds & Refills for this Visit:  Requested Prescriptions      No prescriptions requested or ordered in this encounter       Risk and benefits of medication discussed. Stressed importance of completing full course of antibiotic.     There are no Patient Instructions on file for this visit.      The patient indicates understanding of these issues and agrees to the plan.  The patient is asked to return in 3 days if not better. Call if fever, vomiting, worsening symptoms.  Go to ED if back/flank pain with fever and vomiting.

## 2025-02-17 ENCOUNTER — TELEPHONE (OUTPATIENT)
Dept: FAMILY MEDICINE CLINIC | Facility: CLINIC | Age: 60
End: 2025-02-17

## 2025-02-17 DIAGNOSIS — R39.9 UTI SYMPTOMS: Primary | ICD-10-CM

## 2025-02-17 RX ORDER — CEPHALEXIN 500 MG/1
500 CAPSULE ORAL 3 TIMES DAILY
Qty: 30 CAPSULE | Refills: 0 | Status: SHIPPED | OUTPATIENT
Start: 2025-02-17 | End: 2025-02-17

## 2025-02-17 RX ORDER — NITROFURANTOIN 25; 75 MG/1; MG/1
100 CAPSULE ORAL 2 TIMES DAILY
Qty: 20 CAPSULE | Refills: 0 | Status: SHIPPED | OUTPATIENT
Start: 2025-02-17 | End: 2025-02-27

## 2025-02-17 NOTE — TELEPHONE ENCOUNTER
Pt seen at [facility] on 2/12/25,  urine culture negative, however pt still with symptoms- urgency,  pain with urination, taking AZO now for comfort and cranberry pills.     Pt also was seen on 2/4/25- macrobid prescribed at that time.     Pt asking if she should get more antibiotics? Recheck urine?  Pt's last ov 1/2023

## 2025-02-17 NOTE — TELEPHONE ENCOUNTER
It sounds like it failed though, with the quick recurrence, we can do an extended period of time and see how it goes.

## 2025-02-17 NOTE — TELEPHONE ENCOUNTER
Pt notified, she may or  may not be able to do urine culture, she is traveling this week.  Strongly encouraged urine culture be done before starting antibiotics so we can be sure she is on right medication    Pt also states she can not tolerate Cephalaxin, requesting Macrobid stating this is the only thing that works for her.

## 2025-02-17 NOTE — TELEPHONE ENCOUNTER
If possible would recommend urine culture before starting abx, but will send abx course, most recent culture may have been negative due to lingering antibiotics.    Christopher Lainez MD

## 2025-02-17 NOTE — TELEPHONE ENCOUNTER
Pt calling and states she is having UTI symptoms again. She was told by the walk in clinic to go to her PCP if the symptoms persist.     There are not any openings   Please advise

## 2025-03-24 ENCOUNTER — TELEPHONE (OUTPATIENT)
Facility: CLINIC | Age: 60
End: 2025-03-24

## 2025-04-28 ENCOUNTER — TELEPHONE (OUTPATIENT)
Dept: FAMILY MEDICINE CLINIC | Facility: CLINIC | Age: 60
End: 2025-04-28

## 2025-04-28 NOTE — TELEPHONE ENCOUNTER
Last ov 1/2023    Pt will need to go to [facility] if she gets UTI symptoms to check urine

## 2025-04-28 NOTE — TELEPHONE ENCOUNTER
Patient calling for script for Macrobid , she is traveling 6/5 - 17 and very prone to UTI and would like to have it on hand.

## 2025-05-08 ENCOUNTER — TELEMEDICINE (OUTPATIENT)
Dept: FAMILY MEDICINE CLINIC | Facility: CLINIC | Age: 60
End: 2025-05-08
Payer: COMMERCIAL

## 2025-05-08 DIAGNOSIS — N39.0 RECURRENT UTI: Primary | ICD-10-CM

## 2025-05-08 PROCEDURE — 98005 SYNCH AUDIO-VIDEO EST LOW 20: CPT | Performed by: PHYSICIAN ASSISTANT

## 2025-05-08 RX ORDER — NITROFURANTOIN 25; 75 MG/1; MG/1
100 CAPSULE ORAL DAILY
Qty: 90 CAPSULE | Refills: 1 | Status: SHIPPED | OUTPATIENT
Start: 2025-05-08

## 2025-05-08 NOTE — PROGRESS NOTES
Ela Lobo is a 59 year old female.  No chief complaint on file.      HPI:     History of Present Illness  Ela Lobo is a 59 year old female who presents for prophylactic treatment of recurrent urinary tract infections. She was previously managed by Dr. Santos, who has since retired.    She has a history of recurrent urinary tract infections (UTIs), which she describes as 'so painful'. Previously, she was on a prophylactic regimen of Macrobid, taking it after intercourse to prevent UTIs.    Recently, she has not been on the prophylactic regimen and experienced two UTIs this past winter, marking the first occurrence in about a year and a half. She is concerned about potential dehydration and intestinal issues that could lead to another UTI, especially with an upcoming trip to [facility].    She plans to use Macrobid as needed, either prophylactically after intercourse or if symptoms of a UTI arise, with a dosage of twice daily for seven to ten days if necessary.        Current Medications[1]   Past Medical History[2]   Past Surgical History[3]   Social History:  Short Social Hx on File[4]     REVIEW OF SYSTEMS:   GENERAL HEALTH: Denies fevers, chills, sweats, and fatigue.  RESPIRATORY: Denies shortness of breath, wheezing, and cough.  CARDIOVASCULAR: Denies chest pain, palpitations, and edema.  GI: denies abdominal pain; denies constipation  NEURO: Denies numbness/tingling, weakness, and headaches  SKIN: Denies rash  PSYCH: Denies anxiety and depression    EXAM:   There were no vitals taken for this visit.  GENERAL: well developed, well nourished, NAD.  HEENT: AT/NC. Normal lips, tongue, gums, teeth  LUNGS: Breathing comfortably, speaking in clear sentences without increased work of breathing,.  CARDIO: No cyanosis or edema  NUERO: Normal mood and affect.    PSYCH: A&Ox3, normal mood and affect    ASSESSMENT AND PLAN:     Assessment & Plan  Recurrent urinary tract infections  Recurrent UTIs managed with  prophylactic Macrobid. Requested Macrobid for travel to [place].  - Prescribed Macrobid 100 mg, 90 tablets with extra refill.  - Instructed to take one daily for prophylaxis or post-coitus if needed.  - Advised twice daily for 7-10 days if UTI symptoms occur.      Recording duration: 3 minutes          The patient indicates understanding of these issues and agrees to the plan.    No follow-ups on file.      Valeria Meyers PA-C  5/8/2025  3:26 PM              Telehealth outside of [facility]  Telehealth Verbal Consent   I conducted a telehealth visit with Ela Lobo today, 05/08/25, which was completed using two-way, real-time interactive audio and video communication. This has been done in good barby to provide continuity of care in the best interest of the provider-patient relationship, due to the COVID -19 public health crisis/national emergency where restrictions of face-to-face office visits are ongoing. Every conscious effort was taken to allow for sufficient and adequate time to complete the visit.  The patient was made aware of the limitations of the telehealth visit, including treatment limitations as no physical exam could be performed.  The patient was advised to call 911 or to go to the ER in case there was an emergency.  The patient was also advised of the potential privacy & security concerns related to the telehealth platform.   The patient was made aware of where to find [facility]'s notice of privacy practices, telehealth consent form and other related consent forms and documents.  which are located on the [facility] website. The patient verbally agreed to telehealth consent form, related consents and the risks discussed.    Lastly, the patient confirmed that they were in [place].   Included in this visit, time may have been spent reviewing labs, medications, radiology tests and decision making. Appropriate medical decision-making and tests are ordered as detailed in the plan of care above.  Coding/billing  information is submitted for this visit based on complexity of care and/or time spent for the visit.           [1]   Current Outpatient Medications   Medication Sig Dispense Refill    amoxicillin 250 MG Oral Cap Take 1 capsule (250 mg total) by mouth daily. 30 capsule 5    lidocaine-prilocaine 2.5-2.5 % External Cream Apply to affected areas 1 hr prior to office visit 30 g 0    Ergocalciferol (VITAMIN D OR) Take by mouth.      triamcinolone 0.1 % External Cream Apply to skin BID prn rash. Do not use for more than 10 consecutive days. 80 g 5    tretinoin 0.025 % External Cream Apply 1 Application topically 2 (two) times a day. 20 g 5    FLUTICASONE PROPIONATE 50 MCG/ACT Nasal Suspension SHAKE LIQUID AND USE 2 SPRAYS IN EACH NOSTRIL DAILY (Patient not taking: Reported on 8/1/2023) 48 g 0    CRANBERRY CONCENTRATE OR Take by mouth.     [2]   Past Medical History:   Achilles bursitis or tendinitis    Acne    Acute cystitis    Acute sinusitis, unspecified    Acute upper respiratory infections of unspecified site    Bronchitis, not specified as acute or chronic    Dystonia    2016    H/O mammogram    benign    H/O mammogram    negative    H/O mammogram    A 1 year screening mammogram is recommended. Given the patients breast density, screening automated breast autrasound should be considered as a supplement to the patients annual mammogram.    H/O mammogram    There is no evidence of mammographic malignancy. A 1 year screening mammogram is recommended.    H/O mammogram    Benign:  1 year screening mammogram is recommended. Given the patient's breast density, screening automated breast ultrasound should be considered as a supplement to the patient's annual mammogram.    History of endometrial biopsy    no hyperplasia and/or malignancy noted    Mitral valve disorders(424.0)    Oral aphthae    Pap smear for cervical cancer screening    negative    Personal history of urinary (tract) infection    Pleurisy without mention of  effusion or current tuberculosis   [3]   Past Surgical History:  Procedure Laterality Date    Other surgical history      urethra surgery, as a child   [4]   Social History  Socioeconomic History    Marital status:    Tobacco Use    Smoking status: Never    Smokeless tobacco: Never   Vaping Use    Vaping status: Never Used   Substance and Sexual Activity    Alcohol use: Yes     Alcohol/week: 7.0 standard drinks of alcohol     Types: 7 Standard drinks or equivalent per week    Drug use: No    Sexual activity: Yes     Partners: Male     Birth control/protection: Vasectomy     Comment: 2003   Other Topics Concern    Caffeine Concern No    Exercise Yes     Comment: 3 x week    Seat Belt Yes     Social Drivers of Health      Received from [facility]    Housing Stability

## 2025-06-23 ENCOUNTER — OFFICE VISIT (OUTPATIENT)
Dept: FAMILY MEDICINE CLINIC | Facility: CLINIC | Age: 60
End: 2025-06-23
Payer: COMMERCIAL

## 2025-06-23 VITALS
DIASTOLIC BLOOD PRESSURE: 66 MMHG | OXYGEN SATURATION: 98 % | BODY MASS INDEX: 18.19 KG/M2 | RESPIRATION RATE: 18 BRPM | TEMPERATURE: 98 F | WEIGHT: 120 LBS | HEART RATE: 87 BPM | SYSTOLIC BLOOD PRESSURE: 108 MMHG | HEIGHT: 68 IN

## 2025-06-23 DIAGNOSIS — R05.3 PERSISTENT COUGH: Primary | ICD-10-CM

## 2025-06-23 PROCEDURE — 3078F DIAST BP <80 MM HG: CPT | Performed by: FAMILY MEDICINE

## 2025-06-23 PROCEDURE — 99213 OFFICE O/P EST LOW 20 MIN: CPT | Performed by: FAMILY MEDICINE

## 2025-06-23 PROCEDURE — 3008F BODY MASS INDEX DOCD: CPT | Performed by: FAMILY MEDICINE

## 2025-06-23 PROCEDURE — 3074F SYST BP LT 130 MM HG: CPT | Performed by: FAMILY MEDICINE

## 2025-06-23 RX ORDER — BENZONATATE 200 MG/1
200 CAPSULE ORAL 3 TIMES DAILY PRN
Qty: 30 CAPSULE | Refills: 0 | Status: SHIPPED | OUTPATIENT
Start: 2025-06-23

## 2025-06-23 NOTE — PROGRESS NOTES
[facility] Progress Note    SUBJECTIVE: Ela Lobo 59 year old female is here today for   Chief Complaint   Patient presents with    Cough     Started Friday, was a dry cough at 1st and is now a productive cough with green phlegm        Stayed with her son to help with postsurgical, and got a significant cough, started kind of barky, and now productive.    No fevers or chills.    He hadn't gotten ill.    Did just fly back from [place] on the 18th    PMH  Past Medical History[1]     PSH  Past Surgical History[2]     Social Hx:  No changes    ROS  See HPI    OBJECTIVE:  /66   Pulse 87   Temp 97.9 °F (36.6 °C) (Oral)   Resp 18   Ht 5' 8\" (1.727 m)   Wt 120 lb (54.4 kg)   SpO2 98%   BMI 18.25 kg/m²     Exam    CV: RRR, s1 and s2 present, no murmurs clicks or rubs  Resp: clear to auscultation bilaterally        Labs:          Meds:   Current Medications[3]      Assessment/Plan  Ela was seen today for cough.    Diagnoses and all orders for this visit:    Persistent cough  -     benzonatate 200 MG Oral Cap; Take 1 capsule (200 mg total) by mouth 3 (three) times daily as needed for cough.         Exam is benign, ok to watchand wait for another 2-3 days and if worsening treat as possible bacterial infection, if viral should start improving shortly         Total Time spent with patient and coordinating care:  15 minutes.    Follow up: as needed      Christopher Lainez MD         [1]   Past Medical History:   Achilles bursitis or tendinitis    Acne    Acute cystitis    Acute sinusitis, unspecified    Acute upper respiratory infections of unspecified site    Bronchitis, not specified as acute or chronic    Dystonia    2016    H/O mammogram    benign    H/O mammogram    negative    H/O mammogram    A 1 year screening mammogram is recommended. Given the patients breast density, screening automated breast autrasound should be considered as a supplement to the patients annual mammogram.    H/O mammogram     There is no evidence of mammographic malignancy. A 1 year screening mammogram is recommended.    H/O mammogram    Benign:  1 year screening mammogram is recommended. Given the patient's breast density, screening automated breast ultrasound should be considered as a supplement to the patient's annual mammogram.    History of endometrial biopsy    no hyperplasia and/or malignancy noted    Mitral valve disorders(424.0)    Oral aphthae    Pap smear for cervical cancer screening    negative    Personal history of urinary (tract) infection    Pleurisy without mention of effusion or current tuberculosis   [2]   Past Surgical History:  Procedure Laterality Date    Other surgical history      urethra surgery, as a child   [3]   Current Outpatient Medications   Medication Sig Dispense Refill    benzonatate 200 MG Oral Cap Take 1 capsule (200 mg total) by mouth 3 (three) times daily as needed for cough. 30 capsule 0    nitrofurantoin monohydrate macro (MACROBID) 100 MG Oral Cap Take 1 capsule (100 mg total) by mouth daily. 90 capsule 1    amoxicillin 250 MG Oral Cap Take 1 capsule (250 mg total) by mouth daily. 30 capsule 5    lidocaine-prilocaine 2.5-2.5 % External Cream Apply to affected areas 1 hr prior to office visit 30 g 0    Ergocalciferol (VITAMIN D OR) Take by mouth.      triamcinolone 0.1 % External Cream Apply to skin BID prn rash. Do not use for more than 10 consecutive days. 80 g 5    tretinoin 0.025 % External Cream Apply 1 Application topically 2 (two) times a day. 20 g 5    CRANBERRY CONCENTRATE OR Take by mouth.

## 2025-06-24 ENCOUNTER — TELEPHONE (OUTPATIENT)
Dept: FAMILY MEDICINE CLINIC | Facility: CLINIC | Age: 60
End: 2025-06-24

## 2025-06-24 DIAGNOSIS — J01.10 ACUTE NON-RECURRENT FRONTAL SINUSITIS: Primary | ICD-10-CM

## 2025-06-24 NOTE — TELEPHONE ENCOUNTER
S/w pt  Advised of below  She voiced understanding  She will stay the course for now with Augmentin.  I let her know to update us if she feels it is not helping her sx's before the weekend in case we need to change.

## 2025-06-24 NOTE — TELEPHONE ENCOUNTER
See below. We did not send vanco, Augmentin was sent    S/w pt. She voices concern on the abx sent, takes amox 250 mg daily for her skin, taken for many yrs. Wonders if current abx will interfere and be too much amox or if if will not work as effectively as she may have tolerance since taking current amox for so long?    I did let her know we show amox 250mg on her list but she still wants to make sure you aware    Please advise if you still recommends this abx vs an alternative, thx

## 2025-06-24 NOTE — TELEPHONE ENCOUNTER
See below from pt  Per your ofc  notes:     \"ok to watchand wait for another 2-3 days and if worsening treat as possible bacterial infection, if viral should start improving shortly\"    We are not at 2-3 day bere but I s/w pt. Cough still, heavy on chest, feels she wheeze, no sob. Cough rx helps some but she feels this is all in her chest.       Last night reports low grade fever. Afebrile currently    Sts she \"wants an abx\"    Please advise thx

## 2025-06-24 NOTE — TELEPHONE ENCOUNTER
I do think it would still work, I can change to another if she prefers. If so would be cefdinir 300 mg BID for 10 days    Christopher Lainez MD

## 2025-06-24 NOTE — TELEPHONE ENCOUNTER
Patient was seen yesterday for chest cold and cough by Dr. Lainez and was told to call back today if she is not feeling better to get antibiotic, sx same as yesterday only worse, please advise    [id number] [place], [place] - [facility] [address], [phone], [phone] [[id number]]

## 2025-06-25 ENCOUNTER — TELEPHONE (OUTPATIENT)
Dept: FAMILY MEDICINE CLINIC | Facility: CLINIC | Age: 60
End: 2025-06-25

## 2025-06-25 DIAGNOSIS — J01.10 ACUTE NON-RECURRENT FRONTAL SINUSITIS: Primary | ICD-10-CM

## 2025-06-25 RX ORDER — CEFDINIR 300 MG/1
300 CAPSULE ORAL 2 TIMES DAILY
Qty: 20 CAPSULE | Refills: 0 | Status: SHIPPED | OUTPATIENT
Start: 2025-06-25

## 2025-06-25 NOTE — TELEPHONE ENCOUNTER
Patient states she wants alternate medication. Took Augmentin with food as directed.  She does not believe probiotic will make a difference. Also requests a \"coated\" tablet as she does not do well with swallowing large tablets.  Please advise.

## 2025-06-25 NOTE — TELEPHONE ENCOUNTER
Started Augmentin yesterday.  She vomited all night.  She took another one this morning and is nauseas

## 2025-06-26 ENCOUNTER — HOSPITAL ENCOUNTER (OUTPATIENT)
Age: 60
Discharge: HOME | End: 2025-06-26
Payer: COMMERCIAL

## 2025-06-26 ENCOUNTER — APPOINTMENT (OUTPATIENT)
Dept: GENERAL RADIOLOGY | Age: 60
End: 2025-06-26
Attending: PHYSICIAN ASSISTANT
Payer: COMMERCIAL

## 2025-06-26 ENCOUNTER — TELEPHONE (OUTPATIENT)
Dept: FAMILY MEDICINE CLINIC | Facility: CLINIC | Age: 60
End: 2025-06-26

## 2025-06-26 VITALS
WEIGHT: 120 LBS | DIASTOLIC BLOOD PRESSURE: 77 MMHG | TEMPERATURE: 99 F | HEART RATE: 94 BPM | SYSTOLIC BLOOD PRESSURE: 121 MMHG | RESPIRATION RATE: 16 BRPM | OXYGEN SATURATION: 97 % | BODY MASS INDEX: 18 KG/M2

## 2025-06-26 DIAGNOSIS — R09.82 PND (POST-NASAL DRIP): ICD-10-CM

## 2025-06-26 DIAGNOSIS — R06.2 WHEEZING: Primary | ICD-10-CM

## 2025-06-26 PROCEDURE — 99213 OFFICE O/P EST LOW 20 MIN: CPT

## 2025-06-26 PROCEDURE — 71046 X-RAY EXAM CHEST 2 VIEWS: CPT | Performed by: PHYSICIAN ASSISTANT

## 2025-06-26 PROCEDURE — 99204 OFFICE O/P NEW MOD 45 MIN: CPT

## 2025-06-26 PROCEDURE — 94640 AIRWAY INHALATION TREATMENT: CPT

## 2025-06-26 RX ORDER — PREDNISONE 20 MG/1
40 TABLET ORAL DAILY
Qty: 8 TABLET | Refills: 0 | Status: SHIPPED | OUTPATIENT
Start: 2025-06-26 | End: 2025-06-30

## 2025-06-26 RX ORDER — DEXAMETHASONE 4 MG/1
4 TABLET ORAL ONCE
Status: DISCONTINUED | OUTPATIENT
Start: 2025-06-26 | End: 2025-06-26

## 2025-06-26 RX ORDER — ALBUTEROL SULFATE 90 UG/1
2 INHALANT RESPIRATORY (INHALATION) ONCE
Status: COMPLETED | OUTPATIENT
Start: 2025-06-26 | End: 2025-06-26

## 2025-06-26 NOTE — TELEPHONE ENCOUNTER
Patient asking for call back  - she is wanted CXR but knows Dr. Lainez is not in so she wants to know what she is supposed to do

## 2025-06-26 NOTE — TELEPHONE ENCOUNTER
Spoke to pt, states cough not improving, worse at night, unable to sleep.  Fever of 99.5 today    Started Cefdinir yesterday.  Took 2 yesterday and one this morning    Requesting order for chest xray  Approve/deny?

## 2025-06-26 NOTE — ED PROVIDER NOTES
Patient Seen in: [facility]        History  Chief Complaint   Patient presents with    Cough     Stated Complaint: cough, fever    Subjective:   HPI            59-year-old female here with complaint of a productive cough for over a week with fever return from a trip to [facility] in [place].  Patient contacted her PCP who initially prescribed Augmentin.  Patient was unable to keep it down and they switched it to cefdinir.  Denies chest pain, abdominal pain, nausea, vomiting or diarrhea.  Patient is tolerating p.o. speaking full sentences.  Patient is concerned about pneumonia.  Afebrile.      Objective:     No pertinent past medical history.          The patient's medication list, past medical history and social history elements  as listed in today's nurse's notes are reviewed and agree.   The patient's family history is reviewed and is noncontributory to the presenting problem, except as indicated as above.     Past Surgical History:   Procedure Laterality Date    Other surgical history      urethra surgery, as a child                No pertinent social history.            Review of Systems    Positive for stated complaint: cough, fever  Other systems are as noted in HPI.  Constitutional and vital signs reviewed.      All other systems reviewed and negative except as noted above.                  Physical Exam    ED Triage Vitals [06/26/25 1358]   /77   Pulse 94   Resp 16   Temp 99.4 °F (37.4 °C)   Temp src Oral   SpO2 97 %   O2 Device None (Room air)       Current Vitals:   Vital Signs  BP: 121/77  Pulse: 94  Resp: 16  Temp: 99.4 °F (37.4 °C)  Temp src: Oral    Oxygen Therapy  SpO2: 97 %  O2 Device: None (Room air)            Physical Exam  Vitals and nursing note reviewed.   Constitutional:       Appearance: Normal appearance. She is well-developed.   HENT:      Head: Normocephalic.      Jaw: There is normal jaw occlusion.      Right Ear: External ear normal.      Left Ear: External ear normal.       Nose: Nose normal.      Mouth/Throat:      Lips: Pink.      Mouth: Mucous membranes are moist.      Pharynx: Postnasal drip present.   Eyes:      Conjunctiva/sclera: Conjunctivae normal.      Pupils: Pupils are equal, round, and reactive to light.   Cardiovascular:      Rate and Rhythm: Normal rate and regular rhythm.      Heart sounds: Normal heart sounds.   Pulmonary:      Effort: Pulmonary effort is normal.      Breath sounds: Decreased breath sounds, wheezing and rhonchi present.   Musculoskeletal:      Cervical back: Normal range of motion and neck supple.   Skin:     General: Skin is warm.      Capillary Refill: Capillary refill takes less than 2 seconds.   Neurological:      General: No focal deficit present.      Mental Status: She is alert and oriented to person, place, and time.   Psychiatric:         Mood and Affect: Mood normal.         Behavior: Behavior normal.         Thought Content: Thought content normal.         Judgment: Judgment normal.               ED Course  I personally reviewed the xray images and and saw these findings: no pneumonia  XR CHEST PA + LAT CHEST (DSF=28515)  Result Date: 6/26/2025  PROCEDURE: XR CHEST PA + LAT CHEST (CPT=71046) INDICATIONS: cough, fever PATIENT STATED HISTORY: Patient states that she has a productive cough and chest pain when coughing for the past 6 days. COMPARISON: December 4, 2020     CONCLUSION: Normal cardiac and mediastinal contours. Lungs and pleural spaces are clear. No acute osseous findings. Note made of gaseous distention of upper abdominal bowel loops. Electronically Verified and Signed by Attending Radiologist: Katherine Owens MD 6/26/2025 2:49 PM Workstation: [id number]                          [facility]    Clinical Impression: wheezing/PND  Course of Treatment:   The decadron will work in your system the next several days.  You may start the additional prednisone on day 2 or 3 if symptoms persist.  Use your inhaler every 4-6 hours as  needed.  Recommend taking an over the counter antihistamine daily: IE zyrtec/claritin.  Sleep more upright. Use chloraseptic spray to help stop the cough trigger reflex.  Push fluids and gargle with warm saline rinses.   NOTE: You may finish the course of antibiotics that your PCP prescribed.  If symptoms persist or worsen IE: increasing fevers or shortness of breath recommend the emergency room.  Otherwise close follow-up with your PCP for further evaluation and treatment.    The patient is encouraged to return if any concerning symptoms arise. Additional verbal discharge instructions are given and discussed. Discharge medications are discussed. The patient is in good condition throughout the visit today and remains so upon discharge. I discuss the plan of care with the patient, who expresses understanding. All questions and concerns are addressed to the patient's satisfaction prior to discharge today.  Previous conversations with PCP and charts were reviewed.              Disposition and Plan     Clinical Impression:  1. Wheezing    2. PND (post-nasal drip)         Disposition:  Discharge  6/26/2025  3:08 pm    Follow-up:  Susan Cespedes DO  [address] [place] [id number]  [phone]                Medications Prescribed:  Current Discharge Medication List        START taking these medications    Details   predniSONE 20 MG Oral Tab Take 2 tablets (40 mg total) by mouth daily for 4 days. Start on day 2-3 if symptoms persist  Qty: 8 tablet, Refills: 0                   Supplementary Documentation:

## 2025-06-26 NOTE — DISCHARGE INSTRUCTIONS
Please return to the ER/clinic if symptoms worsen. Follow-up with your PCP in 24-48 hours as needed.    The decadron will work in your system the next several days.  You may start the additional prednisone on day 2 or 3 if symptoms persist.  Use your inhaler every 4-6 hours as needed.  Recommend taking an over the counter antihistamine daily: IE zyrtec/claritin.  Sleep more upright. Use chloraseptic spray to help stop the cough trigger reflex.  Push fluids and gargle with warm saline rinses.   NOTE: You may finish the course of antibiotics that your PCP prescribed.  If symptoms persist or worsen IE: increasing fevers or shortness of breath recommend the emergency room.  Otherwise close follow-up with your PCP for further evaluation and treatment.

## 2025-06-26 NOTE — ED INITIAL ASSESSMENT (HPI)
Cough  x 6 days. Pt has treated by PCP  with augmentin  but  did not tolerate it .  Pt rivas her PCP yesterday and changed antibiotic  to cefdinir   shashi pt has started yesterday. Pt  c/o pain when coughing, ribs hurt ,  temp 99  at home. Pt concerned with pneumonia

## 2025-06-27 ENCOUNTER — HOSPITAL ENCOUNTER (EMERGENCY)
Age: 60
Discharge: HOME | End: 2025-06-27
Attending: EMERGENCY MEDICINE
Payer: COMMERCIAL

## 2025-06-27 VITALS
SYSTOLIC BLOOD PRESSURE: 116 MMHG | HEIGHT: 68 IN | DIASTOLIC BLOOD PRESSURE: 75 MMHG | RESPIRATION RATE: 22 BRPM | TEMPERATURE: 98 F | BODY MASS INDEX: 18.19 KG/M2 | HEART RATE: 83 BPM | WEIGHT: 120 LBS | OXYGEN SATURATION: 96 %

## 2025-06-27 DIAGNOSIS — J20.9 ACUTE BRONCHITIS, UNSPECIFIED ORGANISM: Primary | ICD-10-CM

## 2025-06-27 PROCEDURE — 99282 EMERGENCY DEPT VISIT SF MDM: CPT

## 2025-06-27 PROCEDURE — 99283 EMERGENCY DEPT VISIT LOW MDM: CPT

## 2025-06-28 ENCOUNTER — HOSPITAL ENCOUNTER (OUTPATIENT)
Age: 60
Discharge: HOME | End: 2025-06-28
Attending: EMERGENCY MEDICINE
Payer: COMMERCIAL

## 2025-06-28 VITALS
WEIGHT: 120 LBS | RESPIRATION RATE: 16 BRPM | DIASTOLIC BLOOD PRESSURE: 82 MMHG | BODY MASS INDEX: 18 KG/M2 | SYSTOLIC BLOOD PRESSURE: 120 MMHG | TEMPERATURE: 98 F | OXYGEN SATURATION: 98 % | HEART RATE: 73 BPM

## 2025-06-28 DIAGNOSIS — R05.1 ACUTE COUGH: Primary | ICD-10-CM

## 2025-06-28 PROCEDURE — 99214 OFFICE O/P EST MOD 30 MIN: CPT

## 2025-06-28 PROCEDURE — 87798 DETECT AGENT NOS DNA AMP: CPT | Performed by: EMERGENCY MEDICINE

## 2025-06-28 RX ORDER — AZITHROMYCIN 250 MG/1
TABLET, FILM COATED ORAL
Qty: 6 TABLET | Refills: 0 | Status: SHIPPED | OUTPATIENT
Start: 2025-06-28 | End: 2025-07-03

## 2025-06-28 NOTE — ED PROVIDER NOTES
Patient Seen in: [facility] [place]        History  Chief Complaint   Patient presents with    Cough/URI     Stated Complaint: cough    Subjective:   HPI            Patient is a 59-year-old female presents for evaluation of cough.  Patient has had cough for 1 week.  Seen by provider at immediate care 3 days ago.  She was started on Augmentin which made her vomit.  She called her primary doctor 2 days ago and switched to cefdinir.  Patient was seen by provider yesterday at immediate care again and had an x-ray done that was negative.  She was started on steroids, albuterol inhaler which she has been using.  Patient using Chloraseptic is concerned that she may have whooping cough because she did travel to [facility] in [place] week recently.  She is requesting testing for whooping cough.  She denies shortness of breath.  States she has had low-grade fever.  No runny nose or sore throat.      Objective:     Past Medical History:    Achilles bursitis or tendinitis    Acne    Acute cystitis    Acute sinusitis, unspecified    Acute upper respiratory infections of unspecified site    Bronchitis, not specified as acute or chronic    Dystonia    2016    H/O mammogram    benign    H/O mammogram    negative    H/O mammogram    A 1 year screening mammogram is recommended. Given the patients breast density, screening automated breast autrasound should be considered as a supplement to the patients annual mammogram.    H/O mammogram    There is no evidence of mammographic malignancy. A 1 year screening mammogram is recommended.    H/O mammogram    Benign:  1 year screening mammogram is recommended. Given the patient's breast density, screening automated breast ultrasound should be considered as a supplement to the patient's annual mammogram.    History of endometrial biopsy    no hyperplasia and/or malignancy noted    Mitral valve disorders(424.0)    Oral aphthae    Pap smear for cervical cancer screening     negative    Personal history of urinary (tract) infection    Pleurisy without mention of effusion or current tuberculosis              Past Surgical History:   Procedure Laterality Date    Other surgical history      urethra surgery, as a child                Social History     Socioeconomic History    Marital status:    Tobacco Use    Smoking status: Never    Smokeless tobacco: Never   Vaping Use    Vaping status: Never Used   Substance and Sexual Activity    Alcohol use: Yes     Alcohol/week: 7.0 standard drinks of alcohol     Types: 7 Standard drinks or equivalent per week    Drug use: No    Sexual activity: Yes     Partners: Male     Birth control/protection: Vasectomy     Comment: 2003   Other Topics Concern    Caffeine Concern No    Exercise Yes     Comment: 3 x week    Seat Belt Yes     Social Drivers of Health      Received from [facility]    Housing Stability                                Physical Exam    ED Triage Vitals [06/27/25 2240]   /75   Pulse 83   Resp 22   Temp 97.8 °F (36.6 °C)   Temp src Temporal   SpO2 96 %   O2 Device None (Room air)       Current Vitals:   Vital Signs  BP: 116/75  Pulse: 83  Resp: 22  Temp: 97.8 °F (36.6 °C)  Temp src: Temporal    Oxygen Therapy  SpO2: 96 %  O2 Device: None (Room air)            Physical Exam    GENERAL: No acute distress, well appearing and non-toxic, Alert and oriented X 3   HEENT: Normocephalic, atraumatic.  Moist mucous membranes.  Pupils equal round reactive to light accommodation, extraocular motion is intact, sclerae white, conjunctiva is pink.  Oropharynx is unremarkable, no exudate. TMs clear bilaterally  NECK: Supple, trachea midline, no lymphadenopathy.   LUNG: Lungs clear to auscultation bilaterally, no wheezing, no rales, no rhonchi.  CARDIOVASCULAR: Regular rate and rhythm.  Normal S1S2.  No S3S4 or murmur.  SKIN:  warm and dry  NEURO:  no focal deficits        ED Course  Labs Reviewed   RESPIRATORY FLU EXPAND  PANEL + COVID-19                            [facility]     Patient is a 59-year-old female presents to ED for evaluation of a cough for 1 week.  Differential pneumonia, bronchitis, pertussis.  X-ray reviewed yesterday that was negative.  She is already on antibiotics, steroids, bronchodilator.  Offered patient a respiratory panel to evaluate for pertussis and she told nurse she did not want to have this performed and wanted a blood test.  Nurse came out to me and told me this and I was searching on what type of blood test to order for pertussis as I had never ordered these on blood specimens and patient actually absconded from the ER patient can follow-up with her primary doctor as needed.  She should continue present medication.  Repeat imaging not indicated.    Patient was screened and evaluated during this visit.   As a treating physician attending to the patient, I determined, within reasonable clinical confidence and prior to discharge, that an emergency medical condition was not or was no longer present.  There was no indication for further evaluation, treatment or admission on an emergency basis.  Comprehensive verbal and written discharge and follow-up instructions were provided to help prevent relapse or worsening.  Patient was instructed to follow-up with her primary care provider for further evaluation and treatment, but to return immediately to the ER for worsening, concerning, new, changing or persisting symptoms.  I discussed the case with the patient and they had no questions, complaints, or concerns.  Patient felt comfortable going home.            [facility]    Disposition and Plan     Clinical Impression:  1. Acute bronchitis, unspecified organism         Disposition:  Discharge  6/27/2025 11:04 pm    Follow-up:  Susan Cespedes DO  [address] [place] [address]  [phone]    Follow up  As needed          Medications Prescribed:  Current Discharge Medication List                Supplementary  Documentation:

## 2025-06-28 NOTE — ED INITIAL ASSESSMENT (HPI)
Patient states she was seen by her PMD this past Monday and also seen here 2 days ago, treated for URI with steroid and inhaler and instructed to complete antibiotic course from PMD. States she continues to have coughing fits and symptoms not improving. Was seen at ED yesterday and refused viral swab panel. States she is here because she researched and feels she needs lab work for pertussis. States she was traveling internationally in [place] and has been sick for 8 days. Continues to have low grade fever.

## 2025-06-28 NOTE — ED INITIAL ASSESSMENT (HPI)
Cough since last Friday. Diagnosed with URI yesterday. States she is worried that she has whopping cough. Recent trip to [place]

## 2025-06-28 NOTE — ED QUICK NOTES
Patient stated that she did not want to swabbed for the respiratory panel. Staff went back into patient's room to discuss blood draw that patient requested, patient not found, patient left ED on own accord.

## 2025-06-28 NOTE — TELEPHONE ENCOUNTER
Patient spouse stopped in office looking for order for blood draw for whooping cough, per notes from yesterday she was going to ED - she went to IC and ED no blood done, per spouse they said they can't do blood test

## 2025-06-28 NOTE — ED PROVIDER NOTES
Patient Seen in: [facility]       The following individual(s) verbally consented to be recorded using ambient AI listening technology and understand that they can each withdraw their consent to this listening technology at any point by asking the clinician to turn off or pause the recording:    Patient name: Ela Lobo        History  No chief complaint on file.    Stated Complaint: Cough    Subjective:   HPI     Ela Lobo is a 59 year old female with dystonia who presents with a persistent cough.    Her symptoms began a week ago after returning from a two-week trip to [place]. She developed a 'really low kind of barky cough' and a sore throat, which progressively worsened. She experiences convulsive coughing fits that have been persistent throughout the week, significantly impacting her sleep as she is 'up for hours every night.'    On Monday, she was prescribed Augmentin and a cough suppressant. However, after taking Augmentin on Tuesday, she experienced vomiting throughout the night. Consequently, she was switched to a different antibiotic. Despite this change, her symptoms persisted, leading her to seek further evaluation on Thursday, where an x-ray ruled out pneumonia. She was diagnosed with a respiratory infection with wheezing and was prescribed decadron, an inhaler, chloraseptic, and an antihistamine. She has been following this treatment regimen since Thursday.    Despite these treatments, her symptoms have continued to escalate, with relentless coughing that she describes as 'horrible.' Concerned about the possibility of whooping cough due to the nature of her cough, she visited immediate care in [place] last night. She was informed that the test for pertussis involves a nasopharyngeal swab, which she refused due to her previous traumatic experience with a similar test during COVID and her dystonia, which affects her head and neck, making it difficult for her to sit still.    She  reports a low-grade fever of 99.25°F and notes that her cough worsens at night.    Patient is concerned she may have pertussis. Could not tolerate the swab that was offered in the [place] ED. Presented today because she thought she could get a blood test.       Objective:     No pertinent past medical history.            No pertinent past surgical history.              No pertinent social history.            Review of Systems    Positive for stated complaint: Cough  Other systems are as noted in HPI.  Constitutional and vital signs reviewed.      All other systems reviewed and negative except as noted above.                  Physical Exam    ED Triage Vitals [06/28/25 1056]   /82   Pulse 73   Resp 16   Temp 98.1 °F (36.7 °C)   Temp src Oral   SpO2 98 %   O2 Device None (Room air)       Current Vitals:   Vital Signs  BP: 120/82  Pulse: 73  Resp: 16  Temp: 98.1 °F (36.7 °C)  Temp src: Oral    Oxygen Therapy  SpO2: 98 %  O2 Device: None (Room air)            Physical Exam  Vitals and nursing note reviewed.   Constitutional:       Appearance: Normal appearance. She is well-developed.   HENT:      Head: Normocephalic and atraumatic.   Cardiovascular:      Rate and Rhythm: Normal rate and regular rhythm.   Pulmonary:      Effort: Pulmonary effort is normal. No respiratory distress.      Breath sounds: Rhonchi (scattered) present.   Skin:     General: Skin is warm and dry.      Capillary Refill: Capillary refill takes less than 2 seconds.   Neurological:      General: No focal deficit present.      Mental Status: She is alert.   Psychiatric:         Mood and Affect: Mood normal.         Behavior: Behavior normal.                ED Course  Labs Reviewed   B.PERTUSSISB.PARAPERTUSSIS PCR                            MDM            Medical Decision Making  Viral bronchitis, pertussis both in differential.   Pertussis pcr pending at time of discharge.   Discharge on zithromax as prescribed.   Quarantine while taking  zithromax and awaiting results.     Disposition and Plan     Clinical Impression:  1. Acute cough         Disposition:  Discharge  6/28/2025 11:31 am    Follow-up:  Susan Cespedes DO  [address] [place] [address]  [phone]      As needed          Medications Prescribed:  Discharge Medication List as of 6/28/2025 11:35 AM        START taking these medications    Details   azithromycin (ZITHROMAX Z-AMADA) 250 MG Oral Tab Take 2 tablets (500 mg total) by mouth daily for 1 day, THEN 1 tablet (250 mg total) daily for 4 days., Normal, Disp-6 tablet, R-0                   Supplementary Documentation:

## 2025-08-14 ENCOUNTER — OFFICE VISIT (OUTPATIENT)
Facility: CLINIC | Age: 60
End: 2025-08-14

## 2025-08-14 VITALS
HEART RATE: 70 BPM | DIASTOLIC BLOOD PRESSURE: 60 MMHG | WEIGHT: 120.63 LBS | BODY MASS INDEX: 18.28 KG/M2 | HEIGHT: 68 IN | SYSTOLIC BLOOD PRESSURE: 98 MMHG

## 2025-08-14 DIAGNOSIS — Z12.31 ENCOUNTER FOR SCREENING MAMMOGRAM FOR BREAST CANCER: Primary | ICD-10-CM

## 2025-08-14 DIAGNOSIS — Z01.419 WELL WOMAN EXAM WITH ROUTINE GYNECOLOGICAL EXAM: ICD-10-CM

## 2025-08-14 PROCEDURE — 3008F BODY MASS INDEX DOCD: CPT | Performed by: OBSTETRICS & GYNECOLOGY

## 2025-08-14 PROCEDURE — 99396 PREV VISIT EST AGE 40-64: CPT | Performed by: OBSTETRICS & GYNECOLOGY

## 2025-08-14 PROCEDURE — 3074F SYST BP LT 130 MM HG: CPT | Performed by: OBSTETRICS & GYNECOLOGY

## 2025-08-14 PROCEDURE — 3078F DIAST BP <80 MM HG: CPT | Performed by: OBSTETRICS & GYNECOLOGY

## 2025-08-15 ENCOUNTER — TELEPHONE (OUTPATIENT)
Facility: CLINIC | Age: 60
End: 2025-08-15

## (undated) DIAGNOSIS — U07.1 COVID-19: ICD-10-CM

## (undated) NOTE — MR AVS SNAPSHOT
[address]  [address] [facility], [address] [address] [phone]               Thank you for choosing us for your health care visit with Veronique Heredia DO.   We are glad to serve you and happy to provide you with this Fluticasone Propionate 50 MCG/ACT Susp   SHAKE WELL AND USE 2 SPRAYS IN EACH NOSTRIL DAILY   Commonly known as:  FLONASE           Nitrofurantoin Monohyd Macro 100 MG Caps   Take 1 capsule (100 mg total) by mouth 2 (two) times daily.  Used after sex   Comm

## (undated) NOTE — MR AVS SNAPSHOT
Tello Lin  [address]  [facility] [address]  [address] [place] [phone]               Thank you for choosing us for your health care visit with Ana Ayala MD.  We are glad to serve you and happy to provide you with this sum BP Pulse Height Weight BMI Breastfeeding?     126/68 mmHg 80 68\" 118 lb 17.95 kg/m2 No         Current Medications          This list is accurate as of: 4/24/17 11:59 PM.  Always use your most recent med list.                amoxicillin 500 MG Caps   TK O

## (undated) NOTE — IP AVS SNAPSHOT
[facility] [place] [address] [place], [address] ~ [phone]                Discharge Summary   2/22/2017    Radha Smith           Admission Information        Provider Department    2/22/2017 David Henning MD  Mri      Allergies as · DO NOT drink alcoholic beverages or take medication not specifically prescribed for 24 hours  · Start taking sips of fluids and if tolerated then you can eat small meals the rest of the day  · If you have any concerns, PLEASE DO NOT HESITATE to call any you to explore options for quitting.     - If you have concerns related to behavioral health issues or thoughts of harming yourself, contact [address] [facility] at [phone].     - If you don’t have insurance, Carleen Pradhan

## (undated) NOTE — MR AVS SNAPSHOT
[address], [place] [id number]               Thank you for choosing us for your health care visit with Julian Beltrán MD.  We are glad to serve you and happy to provide you with this summary [address]   Phone:  [phone]   Fax:  [phone]    Diagnoses:  Chronic motor tic disorder   Order:  Neuro - External        Comment:  Refer to movement disorder specialist under her insurance coverage      R PH: [phone]    Carleen Rebolledo 72        [facility]:  [address] [place] [address] in [facility] in [address] [place] [address], [address] [place] [address], [address]    [address]   [address] ? If your prescription is due for a refill, you may be due for a follow up appointment. ? To best provide you care, patients receiving routine medications need to be seen at least once a year.      protocol for controlled substances:  Written prescr understanding in the matter.            Allergies as of Mar 13, 2017     Tetracycline Hcl     Facial swelling                Today's Vital Signs     BP Pulse Weight             128/76 mmHg 82 119 lb            Current Medications          This list is accur Tips for increasing your physical activity – Adults who are physically active are less likely to develop some chronic diseases than adults who are inactive.      HOW TO GET STARTED: HOW TO STAY MOTIVATED:   Start activities slowly and build up over time Do

## (undated) NOTE — MR AVS SNAPSHOT
After Visit Summary   2/22/2017    Roc Waterman    MRN: [id number]           Visit Information        Provider Department Dept Phone    2/22/2017 10:30 AM [facility] [phone]      Your Vitals Were     BP Pulse Temp(Src) Resp SpO2 messages to your doctor, view test results, renew prescriptions and request appointments. How Do I Sign Up? In your Internet browser, go to [email or web address]    Click on the Activate your Account if you have an activation code i dairy products with reduced content of saturated and total fat.    Dietary sodium reduction Reduce dietary sodium intake to <= 100 mmol per day (2.4 g sodium or 6 g sodium chloride)   Aerobic physical activity Regular aerobic physical activity (e.g., brisk

## (undated) NOTE — ED AVS SNAPSHOT
J Carlos Avila   MRN: [id number]    Department:  [facility] Emergency Department in [place]   Date of Visit:  9/17/2018           Disclosure     Insurance plans vary and the physician(s) referred by the ER may not be covered by your plan.  Please contact tell this physician (or your personal doctor if your instructions are to return to your personal doctor) about any new or lasting problems. The primary care or specialist physician will see patients referred from the [facility] Emergency Department.  Cheryle Levins

## (undated) NOTE — MR AVS SNAPSHOT
[address], [place] [id number]               Thank you for choosing us for your health care visit with Milena Chambers MD.  We are glad to serve you and happy to provide you with this summary family member will be picking up prescription, office must be given name of individual in advance and they must present an ID as well. ? The name of the person picking up your prescription must be documented in your chart.   Scheduling Tests    If your phy CARLOS Chowdhury ([address] [place] [address] Street)    [address] [facility], [address] [place] [address]   [phone]              Allergies as of Jan 26, 2017     Tetracycline Hcl                 Today's Vital Signs     BP Pulse Weight             12 to obtain this authorization for your ordered radiology test.    To schedule an appointment for your radiology test please call [facility]   at [phone].        Thursday January 26, 2017     Imaging:  MRI SPINE CERVICAL (W+WO) (C

## (undated) NOTE — LETTER
02/23/19        Lemuel Calvo [address]      Dear Paola Gutierrez,    [address] records indicate that you have outstanding lab work and or testing that was ordered for you and has not yet been completed:  Orders Placed This Encounter

## (undated) NOTE — MR AVS SNAPSHOT
[address]  [address] [address] [phone]               Thank you for choosing us for your health care visit with Earl Jara DO.   We are glad to serve you and happy to provide you with this Outpatient Registration, you will be escorted directly to the lab/radiology department. Dietary Instructions: Please have nothing to eat or drink for 6 hours prior to your procedure except for your usual medication with a small amount of water.      The es [address] ([address])    [address] [facility] [address] [facility] [address], [address] [place] [address]            Mar 07, 2017  3:00 PM   Physical - Established Patient with Dalila Martinez MD   [facility] These medications were sent to [facility] 52 700 [facility], [address] Feng [address] [place], [phone], [address] [facility] [address]     Phone:  [phone]    - Nitrofurantoin Monohyd Macro 100 [email or web address]

## (undated) NOTE — MR AVS SNAPSHOT
[facility] [address] [facility]  [address] [facility]  [place] [address]  [phone]               Thank you for choosing us for your health care visit with DARIN Wilcox. We are glad to serve you and happy to provide you with this summary of your visit.   Mike (cystitis), or the kidneys (pyelonephritis). The most common place for an infection is in the bladder. This is called a bladder infection. This is one of the most common infections in women. Most bladder infections are easily treated.  They are not serious Bladder infections are diagnosed by a urine test. They are treated with antibiotics and usually clear up quickly without complications. Treatment helps prevent a more serious kidney infection.   Medicines  Medicines can help in the treatment of a bladder in Call your healthcare provider if all symptoms are not gone after 3 days of treatment. This is especially important if you have repeat infections. If a culture was done, you will be told if your treatment needs to be changed.  If directed, you can call to f * Nitrofurantoin Monohyd Macro 100 MG Caps   Take 1 capsule (100 mg total) by mouth 2 (two) times daily.  Used after sex   Commonly known as:  MACROBID           * Nitrofurantoin Monohyd Macro 100 MG Caps   Take 1 capsule (100 mg total) by mouth 2 (two) ti Support Staff. Remember, MyChart is NOT to be used for urgent needs. For medical emergencies, dial 911.            Visit [facility] online at  [email or web address]